# Patient Record
Sex: MALE | Race: WHITE | NOT HISPANIC OR LATINO | Employment: OTHER | URBAN - METROPOLITAN AREA
[De-identification: names, ages, dates, MRNs, and addresses within clinical notes are randomized per-mention and may not be internally consistent; named-entity substitution may affect disease eponyms.]

---

## 2020-11-16 ENCOUNTER — LAB (OUTPATIENT)
Dept: PULMONOLOGY | Facility: CLINIC | Age: 70
End: 2020-11-16

## 2020-11-16 DIAGNOSIS — Z01.812 BLOOD TESTS PRIOR TO TREATMENT OR PROCEDURE: ICD-10-CM

## 2020-11-16 DIAGNOSIS — Z01.812 BLOOD TESTS PRIOR TO TREATMENT OR PROCEDURE: Primary | ICD-10-CM

## 2020-11-16 PROCEDURE — 99000 SPECIMEN HANDLING OFFICE-LAB: CPT | Performed by: INTERNAL MEDICINE

## 2020-11-16 PROCEDURE — U0004 COV-19 TEST NON-CDC HGH THRU: HCPCS | Performed by: INTERNAL MEDICINE

## 2020-11-17 LAB — SARS-COV-2 RNA RESP QL NAA+PROBE: NOT DETECTED

## 2020-11-18 ENCOUNTER — OFFICE VISIT (OUTPATIENT)
Dept: PULMONOLOGY | Facility: CLINIC | Age: 70
End: 2020-11-18

## 2020-11-18 VITALS
TEMPERATURE: 97.8 F | HEART RATE: 65 BPM | RESPIRATION RATE: 16 BRPM | WEIGHT: 260 LBS | OXYGEN SATURATION: 93 % | HEIGHT: 70 IN | DIASTOLIC BLOOD PRESSURE: 80 MMHG | SYSTOLIC BLOOD PRESSURE: 148 MMHG | BODY MASS INDEX: 37.22 KG/M2

## 2020-11-18 DIAGNOSIS — R06.00 DYSPNEA, UNSPECIFIED TYPE: Primary | ICD-10-CM

## 2020-11-18 DIAGNOSIS — I50.22 CHRONIC SYSTOLIC CHF (CONGESTIVE HEART FAILURE) (HCC): ICD-10-CM

## 2020-11-18 DIAGNOSIS — J41.1 MUCOPURULENT CHRONIC BRONCHITIS (HCC): ICD-10-CM

## 2020-11-18 PROCEDURE — 94726 PLETHYSMOGRAPHY LUNG VOLUMES: CPT | Performed by: INTERNAL MEDICINE

## 2020-11-18 PROCEDURE — 94060 EVALUATION OF WHEEZING: CPT | Performed by: INTERNAL MEDICINE

## 2020-11-18 PROCEDURE — 99204 OFFICE O/P NEW MOD 45 MIN: CPT | Performed by: INTERNAL MEDICINE

## 2020-11-18 PROCEDURE — 94729 DIFFUSING CAPACITY: CPT | Performed by: INTERNAL MEDICINE

## 2020-11-18 RX ORDER — LANOLIN ALCOHOL/MO/W.PET/CERES
1000 CREAM (GRAM) TOPICAL DAILY
COMMUNITY

## 2020-11-18 RX ORDER — TRAZODONE HYDROCHLORIDE 100 MG/1
100 TABLET ORAL NIGHTLY
COMMUNITY

## 2020-11-18 RX ORDER — FINASTERIDE 5 MG/1
5 TABLET, FILM COATED ORAL DAILY
COMMUNITY

## 2020-11-18 RX ORDER — ALLOPURINOL 300 MG/1
300 TABLET ORAL DAILY
COMMUNITY

## 2020-11-18 RX ORDER — OMEPRAZOLE 20 MG/1
20 CAPSULE, DELAYED RELEASE ORAL DAILY
COMMUNITY

## 2020-11-18 RX ORDER — ASPIRIN 81 MG/1
81 TABLET ORAL DAILY
COMMUNITY

## 2020-11-18 RX ORDER — ROPINIROLE 4 MG/1
4 TABLET, FILM COATED ORAL NIGHTLY
COMMUNITY

## 2020-11-18 RX ORDER — TAMSULOSIN HYDROCHLORIDE 0.4 MG/1
1 CAPSULE ORAL DAILY
COMMUNITY

## 2020-11-18 RX ORDER — TIZANIDINE 4 MG/1
8 TABLET ORAL 3 TIMES DAILY
COMMUNITY
Start: 2020-09-30

## 2020-11-18 RX ORDER — NITROGLYCERIN 0.4 MG/1
TABLET SUBLINGUAL
COMMUNITY
Start: 2014-05-21

## 2020-11-18 RX ORDER — FLUTICASONE PROPIONATE 50 MCG
2 SPRAY, SUSPENSION (ML) NASAL DAILY
COMMUNITY

## 2020-11-18 RX ORDER — ALBUTEROL SULFATE 90 UG/1
4 AEROSOL, METERED RESPIRATORY (INHALATION) ONCE
Status: COMPLETED | OUTPATIENT
Start: 2020-11-18 | End: 2020-11-18

## 2020-11-18 RX ORDER — TIOTROPIUM BROMIDE AND OLODATEROL 3.124; 2.736 UG/1; UG/1
2 SPRAY, METERED RESPIRATORY (INHALATION)
COMMUNITY

## 2020-11-18 RX ORDER — LISINOPRIL 40 MG/1
40 TABLET ORAL DAILY
COMMUNITY

## 2020-11-18 RX ORDER — ALBUTEROL SULFATE 90 UG/1
2 AEROSOL, METERED RESPIRATORY (INHALATION) EVERY 4 HOURS PRN
COMMUNITY

## 2020-11-18 RX ORDER — TOPIRAMATE 100 MG/1
100 TABLET, FILM COATED ORAL 2 TIMES DAILY
COMMUNITY

## 2020-11-18 RX ORDER — ATORVASTATIN CALCIUM 80 MG/1
80 TABLET, FILM COATED ORAL DAILY
COMMUNITY

## 2020-11-18 RX ORDER — ASCORBIC ACID 500 MG
500 TABLET ORAL DAILY
COMMUNITY

## 2020-11-18 RX ORDER — CARVEDILOL 12.5 MG/1
12.5 TABLET ORAL 2 TIMES DAILY WITH MEALS
COMMUNITY

## 2020-11-18 RX ADMIN — ALBUTEROL SULFATE 4 PUFF: 90 AEROSOL, METERED RESPIRATORY (INHALATION) at 09:48

## 2020-11-18 NOTE — PROGRESS NOTES
New Patient Pulmonary Office Visit      Patient Name: Migue Alexander    Referring Physician: Hector Phelps MD    Chief Complaint:    Chief Complaint   Patient presents with   • Shortness of Breath       History of Present Illness: Migue Alexander is a 70 y.o. male who is here today to establish care with Pulmonary.  Patient has a past medical history significant for COPD, shoulder sleep apnea, coronary artery disease as post CABG, CKD, PTSD, chronic alcohol abuse, and former tobacco user quit in 2017 with a 50-pack-year history.  Who presents to pulmonary for evaluation of his COPD.  Patient initially started him shortness of breath back in 2008 which gradually worsened over time, he has been on multiple inhalers in the past, but most recently has been moved over to Asmanex as well as Stiolto.  There is a daily nonproductive cough, that is currently stable.  Does not always use his CPAP for short sleep apnea, but will attempt to do so.  Currently has GERD as well and uses Prevacid.  And evaluation for his shortness of breath initially he did undergo a cardiopulmonary stress test which showed a cardiac limitation to exercise with an adequate heart rate response, pulmonary limitation exercise with the saturation between 98 and 91%, with significant air trapping.  He also went a right heart catheterization which showed a mean pulmonary artery pressure of 29, with a pulmonary capillary wedge pressure of 15, pulmonary vascular resistance was 2.6 Wood units.  He notes he has a long history of shortness of breath, that is worse when he exercises.  He states that he has burning sensations in his leg associated with exercise as well, he has had a work-up from a vascular standpoint and is told he has neuropathy for unknown reasons.  He denies any fever, chills, nausea, or vomiting.  He recently had his lisinopril changed due to the concern that this could be causing his underlying cough he has not stopped the  medication as of yet should be here later this week.      Review of Systems:   Review of Systems   Constitutional: Negative for activity change, appetite change, chills and diaphoresis.   HENT: Negative for congestion, postnasal drip, sinus pressure and voice change.    Eyes: Negative for blurred vision.   Respiratory: Positive for cough and shortness of breath. Negative for wheezing.    Cardiovascular: Negative for chest pain.   Gastrointestinal: Negative for abdominal pain.   Musculoskeletal: Negative for myalgias.   Skin: Negative for color change and dry skin.   Allergic/Immunologic: Negative for environmental allergies.   Neurological: Negative for weakness and confusion.   Hematological: Negative for adenopathy.   Psychiatric/Behavioral: Negative for sleep disturbance and depressed mood.       Past Medical History:   Past Medical History:   Diagnosis Date   • COPD (chronic obstructive pulmonary disease) (CMS/[facility])    • GERD (gastroesophageal reflux disease)    • Hyperlipemia    • Hypertension        Past Surgical History:   Past Surgical History:   Procedure Laterality Date   • BACK SURGERY     • KNEE ARTHROPLASTY, PARTIAL REPLACEMENT     • SHOULDER SURGERY         Family History:   Family History   Problem Relation Age of Onset   • Alzheimer's disease Mother    • Heart failure Maternal Grandfather        Social History:   Social History     Socioeconomic History   • Marital status:      Spouse name: Not on file   • Number of children: Not on file   • Years of education: Not on file   • Highest education level: Not on file   Tobacco Use   • Smoking status: Former Smoker     Packs/day: 1.50     Years: 43.00     Pack years: 64.50     Types: Cigarettes     Quit date: 2017     Years since quitting: 3.8   • Smokeless tobacco: Never Used   Substance and Sexual Activity   • Alcohol use: Yes     Drinks per session: 3 or 4     Binge frequency: Weekly   • Drug use: Never   • Sexual activity: Defer        Medications:     Current Outpatient Medications:   •  albuterol sulfate  (90 Base) MCG/ACT inhaler, Inhale 2 puffs Every 4 (Four) Hours As Needed for Wheezing., Disp: , Rfl:   •  allopurinol (ZYLOPRIM) 300 MG tablet, Take 300 mg by mouth Daily., Disp: , Rfl:   •  aspirin 81 MG EC tablet, Take 81 mg by mouth Daily., Disp: , Rfl:   •  atorvastatin (LIPITOR) 80 MG tablet, Take 80 mg by mouth Daily., Disp: , Rfl:   •  carvedilol (COREG) 12.5 MG tablet, Take 12.5 mg by mouth 2 (Two) Times a Day With Meals., Disp: , Rfl:   •  finasteride (PROSCAR) 5 MG tablet, Take 5 mg by mouth Daily., Disp: , Rfl:   •  fluticasone (FLONASE) 50 MCG/ACT nasal spray, 2 sprays into the nostril(s) as directed by provider Daily., Disp: , Rfl:   •  lisinopril (PRINIVIL,ZESTRIL) 40 MG tablet, Take 40 mg by mouth Daily., Disp: , Rfl:   •  mometasone (ASMANEX TWISTHALER) inhaler 220 mcg/inhalation, Inhale 2 puffs Daily., Disp: , Rfl:   •  Morphine Sulfate ER 30 MG tablet extended-release, Take  by mouth Daily., Disp: , Rfl:   •  nitroglycerin (Nitrostat) 0.4 MG SL tablet, Place  under the tongue., Disp: , Rfl:   •  omeprazole (priLOSEC) 20 MG capsule, Take 20 mg by mouth Daily., Disp: , Rfl:   •  rOPINIRole (REQUIP) 4 MG tablet, Take 4 mg by mouth Every Night., Disp: , Rfl:   •  tamsulosin (FLOMAX) 0.4 MG capsule 24 hr capsule, Take 1 capsule by mouth Daily., Disp: , Rfl:   •  tiotropium bromide-olodaterol (Stiolto Respimat) 2.5-2.5 MCG/ACT aerosol solution inhaler, Inhale 2 puffs Daily., Disp: , Rfl:   •  tiZANidine (ZANAFLEX) 4 MG tablet, Take 8 mg by mouth 3 (Three) Times a Day., Disp: , Rfl:   •  topiramate (TOPAMAX) 100 MG tablet, Take 100 mg by mouth 2 (Two) Times a Day., Disp: , Rfl:   •  traZODone (DESYREL) 100 MG tablet, Take 100 mg by mouth Every Night., Disp: , Rfl:   •  vitamin B-12 (CYANOCOBALAMIN) 1000 MCG tablet, Take 1,000 mcg by mouth Daily., Disp: , Rfl:   •  vitamin C (ASCORBIC ACID) 500 MG tablet, Take 500 mg by  "mouth Daily., Disp: , Rfl:   No current facility-administered medications for this visit.     Allergies:   Allergies   Allergen Reactions   • Sulfa Antibiotics Hives       Physical Exam:  Vital Signs:   Vitals:    11/18/20 0919   BP: 148/80   BP Location: Right arm   Patient Position: Sitting   Cuff Size: Adult   Pulse: 65   Resp: 16   Temp: 97.8 °F (36.6 °C)   SpO2: 93%  Comment: room air at rest   Weight: 118 kg (260 lb)   Height: 177.8 cm (70\")       Physical Exam  Vitals signs and nursing note reviewed.   Constitutional:       General: He is not in acute distress.     Appearance: He is well-developed and normal weight. He is not ill-appearing or toxic-appearing.   HENT:      Head: Normocephalic and atraumatic.      Right Ear: External ear normal.      Left Ear: External ear normal.      Nose: Nose normal.      Mouth/Throat:      Mouth: Mucous membranes are moist.      Pharynx: Oropharynx is clear. No oropharyngeal exudate or posterior oropharyngeal erythema.   Eyes:      Conjunctiva/sclera: Conjunctivae normal.      Pupils: Pupils are equal, round, and reactive to light.   Neck:      Musculoskeletal: Normal range of motion and neck supple. No neck rigidity.   Cardiovascular:      Rate and Rhythm: Normal rate and regular rhythm.      Pulses: Normal pulses.      Heart sounds: Normal heart sounds. No murmur. No friction rub. No gallop.    Pulmonary:      Effort: Pulmonary effort is normal. No respiratory distress.      Breath sounds: Normal breath sounds. No wheezing, rhonchi or rales.   Abdominal:      General: Bowel sounds are normal. There is no distension.      Palpations: Abdomen is soft.      Tenderness: There is no abdominal tenderness. There is no rebound.   Musculoskeletal: Normal range of motion.      Right lower leg: No edema.      Left lower leg: No edema.   Skin:     General: Skin is warm and dry.      Capillary Refill: Capillary refill takes less than 2 seconds.   Neurological:      General: No focal " deficit present.      Mental Status: He is alert and oriented to person, place, and time.   Psychiatric:         Mood and Affect: Mood normal.         Behavior: Behavior normal.         Thought Content: Thought content normal.         Judgment: Judgment normal.         Results Review:   - I personally reviewed the pts imaging from chest x-ray 11/18/2020 showed no acute cardiopulmonary findings, but was consistent with old granulomatous disease.  - I personally reviewed the pts PFT from 11/18/2020 showed moderate obstruction with significant air trapping, moderately Doose DLCO and no restriction.  - I personally reviewed the pts chart which included an echo showing EF of 35 to 45%, cardiopulmonary stress test with cardiac and pulmonary limitations, and a right heart cath which shows signs of heart failure, and elevated pulmonary capillary wedge pressure as well as mean pulmonary artery pressure at 15 and 29 expectedly, but fully vascular system was only 2.6, as this is not consistent with pulmonary hypertension.    Assessment / Plan:   1. Dyspnea, unspecified type (Primary)  2. Mucopurulent chronic bronchitis (CMS/HCC)  3. Chronic systolic CHF (congestive heart failure) (CMS/HCC)  -Patients shortness of breath is likely multifactorial due to underlying COPD as well as CHF, his cardiopulmonary stress test is quite helpful in determining that he has a cardiac limitation and a pulmonary limitation, with significant air trapping.  Which we then saw on his PFTs from today.  He is on appropriate inhaler therapy, with Stiolto and Asmanex, which I would like him to continue.  In addition to that I do think he would benefit from pulmonary rehab, will have to be careful only work with him given the fact that he has had previous shoulder surgery with some limitations, addition of this having his lower extremity pain and burning could limit him on the amount of cardiovascular exercise that he could perform.  He is very adamant  though that he would be able to complete the rehab and would still like to try.  I have gone ahead and ordered rehab to be started on him I did explain to him that due to COVID-19 we have been backed up a little bit we will get him as soon as possible.  -He should continue on his current cardiac medications, appears to be euvolemic on exam today, blood pressure slightly elevated at 148/80, but is switching his blood pressure medications currently.  -Patient is okay to follow back up with his primary pulmonologist at the [facility] in [facility].    Follow Up:   Return if symptoms worsen or fail to improve.     JÚNIOR Rojas, DO  Pulmonary and Critical Care Medicine  Note Electronically Signed    Please note that portions of this note may have been completed with a voice recognition program. Efforts were made to edit the dictations, but occasionally words are mistranscribed.

## 2020-11-19 DIAGNOSIS — R06.00 DYSPNEA, UNSPECIFIED TYPE: Primary | ICD-10-CM

## 2021-03-01 ENCOUNTER — IMMUNIZATION (OUTPATIENT)
Dept: VACCINE CLINIC | Facility: HOSPITAL | Age: 71
End: 2021-03-01

## 2021-03-01 PROCEDURE — 0001A: CPT | Performed by: INTERNAL MEDICINE

## 2021-03-01 PROCEDURE — 91300 HC SARSCOV02 VAC 30MCG/0.3ML IM: CPT | Performed by: INTERNAL MEDICINE

## 2021-03-22 ENCOUNTER — IMMUNIZATION (OUTPATIENT)
Dept: VACCINE CLINIC | Facility: HOSPITAL | Age: 71
End: 2021-03-22

## 2021-03-22 PROCEDURE — 0002A: CPT | Performed by: INTERNAL MEDICINE

## 2021-03-22 PROCEDURE — 91300 HC SARSCOV02 VAC 30MCG/0.3ML IM: CPT | Performed by: INTERNAL MEDICINE

## 2021-04-13 ENCOUNTER — CLINICAL SUPPORT (OUTPATIENT)
Dept: PULMONOLOGY | Facility: CLINIC | Age: 71
End: 2021-04-13

## 2021-04-13 VITALS
HEART RATE: 63 BPM | OXYGEN SATURATION: 98 % | TEMPERATURE: 96.8 F | RESPIRATION RATE: 18 BRPM | DIASTOLIC BLOOD PRESSURE: 85 MMHG | SYSTOLIC BLOOD PRESSURE: 140 MMHG

## 2021-04-13 DIAGNOSIS — J44.9 COPD WITH CHRONIC BRONCHITIS (HCC): ICD-10-CM

## 2021-04-13 PROCEDURE — G0424 PULMONARY REHAB W EXER: HCPCS | Performed by: INTERNAL MEDICINE

## 2021-04-13 NOTE — PROGRESS NOTES
[facility]  Pulmonary Rehabilitation  Daily Treatment Log    Name: Migue Alexander : 1950 Date: 2021     Session: 1/ approved: 18          Time In/Out: 9:     COPD Charges:  x1    Physician : JÚNIOR Rojas DO      Dx: J44.9      GOLD: 1 []  2 []  3 []  4 []     [place]  [place]     /85   Pulse 63   Temp 96.8 °F (36 °C)   Resp 18   SpO2 98%      Auscultation:  []Clear   [x]Clear and Decreased   []Insp. Wheeze   []Exp. Wheeze     []Rhonchi   []Rales    Target Heart Rate Zone:   Max HR: 150    Exercise Sp02% Blood Pressure Heart Rate JERMAIN Scale/Fatigue   Treadmill: Speed:    Min:   % Grade:        Distance:            Bands:#:    Reps:      Min:   Color Band:             6MWT:Min: 6 distance: 600 MET: 1.9  95  140/85  95  3-4, part of initial eval   Step-ups:#:    Reps:     Min:            Cybex/Vision/Schwinn Bike:  Level:   Speed:  RPM:   Min:   Distance:           Seat:             Hand Weights: Pounds: 5 min: 30 seconds  Curls#24     sets: 1  Presses#    Sets:   Fly#            Sets:   Shoulder Shrugs#     Sets:   Wings#       Sets:   Wrist Curls#   Sets:                   97  140/85  68  1, part of initial eval arm curl test   NuStep: Level:   METS:  Min:     SPM:     Total Steps:   Seat:      Arms:            Arm Ergometer: Level:     Min:   RPM:       Fwd:     Back:   Total Turns:             PLB / DB Spinner: Min:   P-Flex: Level:        Min: 5  IS: Volume:             Min: 5  Warm-up Stretches: Min:                Education and Training: [x]JERMAIN Scale  [x]PLB  [x]DB  [x]IMT  [x]IS  []Vital Signs   []Target HR Zone  []Exercise Vital Signs [] Safe Vital Signs  []Pulmonary Anatomy  []Lung Function  []Lung Disease Process  []Oxygen Use  []Oxygen Safety  []Pulmonary Meds  []Med Instruction  []Cough Technique  []Pulmonary Hygeine  []Infection Prevention  []Signs of Infection  []When to Call MD  []Nutrition  []Weight  Management  []Advance Directives  []Posture  []Body Mechanics []Energy Conservation  []Pacing ADL's  []Stress/Anxiety Management  []Stretching  []Home Exercise  []Smoking Cessation   []NJ Progress  []Maintenance Exercise Program    []Pulmonary Knowledge Test/Education  []Review PFT  []Review Specific Disease  []Review Home Follow-through Compliance  []Safe Exercise []Exercise Progression Strategies    Progress Report: Patient here for initial evaluation for pulmonary rehabilitation.  Today patient performed 6-minute walk test which revealed patient will not require any supplemental oxygen during exercise.  Patient had moderate leg and hip pain which required him to rest for 1 minute during the walk.  Patient was unable to do step up test due to hip and leg pain but was able to complete arm curl test, sit to stand, get up and go and balance test.  Patient meets all requirements for pulmonary rehab and I believe this patient would benefit tremendously.  Patient is very motivated and family is very supportive.  I-S  And P Flex given to patient for home use as well as packet of educational material which will be covered in class.    Treating Clinicians: [x]Kun Yuen RRT     MD in Office: [x]JÚNIOR Mckeon  []RAFITA Jim  []CODEY German  [x]JR Felix    []ARLIN Zendejas []ABE Roche  []RAFITA Gonsales  [x]RAFITA Grajeda  []CRISTINA Felix  [x]CATRINA Ulrich   []ARLIN Cuenca  [x]JÚNIOR Rojas

## 2021-04-27 ENCOUNTER — CLINICAL SUPPORT (OUTPATIENT)
Dept: PULMONOLOGY | Facility: CLINIC | Age: 71
End: 2021-04-27

## 2021-04-27 VITALS
OXYGEN SATURATION: 99 % | SYSTOLIC BLOOD PRESSURE: 138 MMHG | HEART RATE: 55 BPM | TEMPERATURE: 97.3 F | RESPIRATION RATE: 16 BRPM | DIASTOLIC BLOOD PRESSURE: 72 MMHG

## 2021-04-27 DIAGNOSIS — J44.9 CHRONIC OBSTRUCTIVE PULMONARY DISEASE, UNSPECIFIED COPD TYPE (HCC): ICD-10-CM

## 2021-04-27 PROCEDURE — G0424 PULMONARY REHAB W EXER: HCPCS | Performed by: INTERNAL MEDICINE

## 2021-04-27 NOTE — PROGRESS NOTES
[facility]  Pulmonary Rehabilitation  Daily Treatment Log    Name: Migue Alexander : 1950 Date: 2021     Session: 2/ approved: 18          Time In/Out: 1:     COPD Charges:  x1    Physician : JÚNIOR Rojas DO      Dx: J44.9      GOLD: 1 []  2 []  3 []  4 []     [place] Insurance[place]  Secondary Insurance[place]     /72   Pulse 55   Temp 97.3 °F (36.3 °C)   Resp 16   SpO2 99%      Auscultation:  []Clear   [x]Clear and Decreased   []Insp. Wheeze   []Exp. Wheeze     []Rhonchi   []Rales    Target Heart Rate Zone:   Max HR: 150    Exercise Sp02% Blood Pressure Heart Rate JERMAIN Scale/Fatigue   Treadmill: Speed:    Min:   % Grade:        Distance:            Bands:#:    Reps:      Min:   Color Band:             6MWT:Min:   Distance:  MET:             Step-ups:#: 10   reps: 2   min: 10  99  138/78  71  1-2   Cybex/Vision/Schwinn Bike:  Level: 1 Speed: 5.3 RPM: 58 min: 10  Distance: 1.5         seat: 8  98  140/89  77  1-2   Hand Weights: Pounds: 5 min: 10  Curls# 10     Sets: 1  Presses#  10  Sets: 1  Fly# 10           Sets: 1  Shoulder Shrugs#  10   Sets: 1  Wings#  10     Sets: 1  Wrist Curls# 10  Sets: 1                  98  138/78  77  1-2   NuStep: Level:   METS:  Min:     SPM:     Total Steps:   Seat:      Arms:            Arm Ergometer: Level:     Min:   RPM:       Fwd:     Back:   Total Turns:             PLB / DB Spinner: Min: 5  P-Flex: Level:       Min:   IS: Volume:             Min:   Warm-up Stretches: Min: 10  98    68  0      Education and Training: [x]JERMAIN Scale  [x]PLB  [x]DB  []IMT  []IS  [x]Vital Signs   [x]Target HR Zone  [x]Exercise Vital Signs [x] Safe Vital Signs  []Pulmonary Anatomy  []Lung Function  [x]Lung Disease Process  []Oxygen Use  []Oxygen Safety  []Pulmonary Meds  []Med Instruction  []Cough Technique  []Pulmonary Hygeine  []Infection Prevention  []Signs of Infection  []When to Call MD  []Nutrition  []Weight Management  []Advance  Directives  []Posture  []Body Mechanics []Energy Conservation  []Pacing ADL's  []Stress/Anxiety Management  [x]Stretching  []Home Exercise  []Smoking Cessation   []MO Progress  []Maintenance Exercise Program    [x]Pulmonary Knowledge Test/Education  []Review PFT  []Review Specific Disease  []Review Home Follow-through Compliance  []Safe Exercise []Exercise Progression Strategies    Progress Report: Patient here for continuation of pulmonary rehabilitation.  Patient missed all last week due to family emergency out of town.  Education today consisted of COPD, what it is, how it is diagnosed, how it is treated, and ways of coping with shortness of air.  Group exercises consisted of stretches, light hand weights and body weight leg strengthening exercises.  Patient broke off into individual workout session on the stationary bike.  Patient had to stop several times due to lower leg pain/soreness but was able to work through 10 minutes.  Advised patient that we would start slow and gradually build up resistance and tolerance of exercise.  Patient also took the pulmonary knowledge test today and will review next session.    Treating Clinicians: [x]Kun Yuen RRT     MD in Office: []JÚNIOR Mckeon  []RAFITA Jim  []CODEY German  []JR Felix    []ARLIN Zendejas []ABE Roche  []RAFITA Gonsales  [x]RAFITA Grajeda  []CRISTINA Felix  []CATRINA Ulrich   [x]ARLIN Cuenca  []JÚNIOR Rojas

## 2021-05-04 ENCOUNTER — CLINICAL SUPPORT (OUTPATIENT)
Dept: PULMONOLOGY | Facility: CLINIC | Age: 71
End: 2021-05-04

## 2021-05-04 VITALS
DIASTOLIC BLOOD PRESSURE: 78 MMHG | HEART RATE: 59 BPM | TEMPERATURE: 97.6 F | RESPIRATION RATE: 18 BRPM | SYSTOLIC BLOOD PRESSURE: 138 MMHG | OXYGEN SATURATION: 98 %

## 2021-05-04 DIAGNOSIS — J44.9 CHRONIC OBSTRUCTIVE PULMONARY DISEASE, UNSPECIFIED COPD TYPE (HCC): ICD-10-CM

## 2021-05-04 PROCEDURE — G0424 PULMONARY REHAB W EXER: HCPCS | Performed by: INTERNAL MEDICINE

## 2021-05-04 NOTE — PROGRESS NOTES
[facility]  Pulmonary Rehabilitation  Daily Treatment Log    Name: Migue Alexander : 1950 Date: 2021     Session: 3 approved: 18          Time In/Out: 1:     COPD Charges:  x1    Physician : JÚNIOR Rojas DO      Dx: J44.9      GOLD: 1 []  2 []  3 []  4 []     Primary Insurance: [place] Secondary Insurance:[place]    /78   Pulse 59   Temp 97.6 °F (36.4 °C)   Resp 18   SpO2 98%      Auscultation:  []Clear   [x]Clear and Decreased   []Insp. Wheeze   []Exp. Wheeze     []Rhonchi   []Rales    Target Heart Rate Zone:   Max HR: 150    Exercise Sp02% Blood Pressure Heart Rate JERMIAN Scale/Fatigue   Treadmill: Speed:    Min:   % Grade:        Distance:            Bands:#:    Reps:      Min:   Color Band:             6MWT:Min:   Distance:  MET:             Step-ups:#: 10   reps: 2   min: 10  99  138/78  61  0-1   Cybex/Vision/Schwinn Bike:  Level:   Speed:  RPM:   Min:   Distance:           Seat:             Hand Weights: Pounds: 5 min: 10  Curls# 10     Sets: 1  Presses#  10  Sets: 1  Fly# 10           Sets: 1  Shoulder Shrugs#  10   Sets: 1  Wings#  10     Sets: 1  Wrist Curls# 10  Sets: 1                  99  138/82  61  0-1   NuStep: Level: 4 METS: 2.4 min: 20     SPM:   71 total Steps: 1280  Seat: 13    arms: 11  99  140/85  64  0-1   Arm Ergometer: Level:     Min:   RPM:       Fwd:     Back:   Total Turns:             PLB / DB Spinner: Min: 5  P-Flex: Level:        Min:   IS: Volume:             Min:   Warm-up Stretches: Min: 10  98    59  0      Education and Training: [x]JERMAIN Scale  [x]PLB  [x]DB  []IMT  []IS  [x]Vital Signs   [x]Target HR Zone  [x]Exercise Vital Signs [x] Safe Vital Signs  []Pulmonary Anatomy  []Lung Function  []Lung Disease Process  []Oxygen Use  []Oxygen Safety  [x]Pulmonary Meds  [x]Med Instruction  []Cough Technique  []Pulmonary Hygeine  []Infection Prevention  []Signs of Infection  []When to Call MD  []Nutrition  []Weight Management   []Advance Directives  []Posture  []Body Mechanics []Energy Conservation  []Pacing ADL's  []Stress/Anxiety Management  []Stretching  []Home Exercise  []Smoking Cessation   [][place] Progress  []Maintenance Exercise Program    []Pulmonary Knowledge Test/Education  []Review PFT  []Review Specific Disease  []Review Home Follow-through Compliance  []Safe Exercise []Exercise Progression Strategies    Progress Report:  Patient here for continuation of pulmonary rehabilitation.  Education consisted of pulmonary meds with med instruction.  Reviewed pulmonary knowledge test.  Discussed the use of spacers and demonstrated how to use a spacer with an MDI.  Patient was able to increase length and tolerance of exercise today with minimal cues for proper PLB.        Treating Clinicians: [x]Kun Yuen RRT     MD in Office: [x]JÚNIOR Mckeon  []RAFITA Jim  []CODEY German  []JR Felix    []ARLIN Zendejas []ABE Roche  []RAFITA Gonasles  []RAFITA Grjaeda  []CRISTINA Felix  []CATRINA Ulrich   []ARLIN Cuenca  [x]JÚNIOR Rojas

## 2021-05-06 ENCOUNTER — CLINICAL SUPPORT (OUTPATIENT)
Dept: PULMONOLOGY | Facility: CLINIC | Age: 71
End: 2021-05-06

## 2021-05-06 VITALS
SYSTOLIC BLOOD PRESSURE: 135 MMHG | HEART RATE: 58 BPM | DIASTOLIC BLOOD PRESSURE: 82 MMHG | OXYGEN SATURATION: 100 % | TEMPERATURE: 97.6 F | RESPIRATION RATE: 18 BRPM

## 2021-05-06 DIAGNOSIS — J44.9 CHRONIC OBSTRUCTIVE PULMONARY DISEASE, UNSPECIFIED COPD TYPE (HCC): ICD-10-CM

## 2021-05-06 PROCEDURE — G0424 PULMONARY REHAB W EXER: HCPCS | Performed by: INTERNAL MEDICINE

## 2021-05-06 NOTE — PROGRESS NOTES
[facility]  Pulmonary Rehabilitation  Daily Treatment Log    Name: Migue Alexander : 1950 Date: 2021     Session: 4/ approved: 18          Time In/Out: 1:     COPD Charges:  x1    Physician : JÚNIOR Rojas DO      Dx: J44.9      GOLD: 1 []  2 []  3 []  4 []     Primary Insurance:  [place]  Secondary Insurance: [place]    /82   Pulse 58   Temp 97.6 °F (36.4 °C)   Resp 18   SpO2 100%      Auscultation:  []Clear   [x]Clear and Decreased   []Insp. Wheeze   []Exp. Wheeze     []Rhonchi   []Rales    Target Heart Rate Zone:   Max HR: 150    Exercise Sp02% Blood Pressure Heart Rate JERMAIN Scale/Fatigue   Treadmill: Speed:    Min:   % Grade:        Distance:            Bands:#:    Reps:      Min:   Color Band:             6MWT:Min:   Distance:  MET:             Step-ups:#: 10   reps: 2   min: 10  98  138/78  78  0-1   Cybex/Vision/Schwinn Bike:  Level:   Speed:  RPM:   Min:   Distance:           Seat:             Hand Weights: Pounds: 5 min: 10  Curls# 10     Sets: 1  Presses#  10  Sets: 1  Fly# 10           Sets: 1  Shoulder Shrugs#  10   Sets: 1  Wings#  10     Sets: 1  Wrist Curls# 10  Sets: 1                  98  138/78  61  0-1   NuStep: Level: 3 METS: 2.4 min: 25     SPM:   64 total Steps: 1388  Seat: 13    arms: 11  97  140/82  71  0-1   Arm Ergometer: Level:     Min:   RPM:       Fwd:     Back:   Total Turns:             PLB / DB Spinner: Min: 5  P-Flex: Level:        Min:   IS: Volume:             Min:   Warm-up Stretches: Min: 10  98    58  0      Education and Training: [x]JERMAIN Scale  [x]PLB  [x]DB  []IMT  []IS  [x]Vital Signs   [x]Target HR Zone  [x]Exercise Vital Signs [x] Safe Vital Signs  []Pulmonary Anatomy  []Lung Function  []Lung Disease Process  []Oxygen Use  []Oxygen Safety  []Pulmonary Meds  []Med Instruction  [x]Cough Technique  [x]Pulmonary Hygeine  [x]Infection Prevention  [x]Signs of Infection  [x]When to Call MD  []Nutrition  []Weight Management   []Advance Directives  []Posture  []Body Mechanics []Energy Conservation  []Pacing ADL's  []Stress/Anxiety Management  []Stretching  []Home Exercise  []Smoking Cessation   [][place] Progress  []Maintenance Exercise Program    []Pulmonary Knowledge Test/Education  []Review PFT  []Review Specific Disease  []Review Home Follow-through Compliance  []Safe Exercise []Exercise Progression Strategies    Progress Report:  Patient here for continuation of pulmonary rehabilitation.  Education consisted of pulmonary hygiene/cough technique/infection prevention/signs of infection and when to call the doctor.  Patient was able to increase intensity and length of exercise today with minimal to moderate cues for PLB.  Patient continues to do well with back/leg pain.     Treating Clinicians: [x]Kun Yuen RRT MD in Office: [x]JÚNIOR Mckeon  []RAFITA Jim  []CODEY German  []JR Felix    []ARLIN Zendejas []ABE Roche  []RAFITA Gonsales  []RAFITA Grajeda  []CRISTINA Felix  [x]CATRINA Ulrich   []ARLIN Cuenca  [x]JÚNIOR Rojas

## 2021-05-07 ENCOUNTER — OFFICE VISIT (OUTPATIENT)
Dept: PULMONOLOGY | Facility: CLINIC | Age: 71
End: 2021-05-07

## 2021-05-07 VITALS
HEIGHT: 70 IN | OXYGEN SATURATION: 94 % | TEMPERATURE: 96.8 F | HEART RATE: 74 BPM | SYSTOLIC BLOOD PRESSURE: 150 MMHG | WEIGHT: 267 LBS | DIASTOLIC BLOOD PRESSURE: 90 MMHG | BODY MASS INDEX: 38.22 KG/M2

## 2021-05-07 DIAGNOSIS — J30.9 ALLERGIC RHINITIS, UNSPECIFIED SEASONALITY, UNSPECIFIED TRIGGER: ICD-10-CM

## 2021-05-07 DIAGNOSIS — J41.1 MUCOPURULENT CHRONIC BRONCHITIS (HCC): Primary | ICD-10-CM

## 2021-05-07 DIAGNOSIS — I50.22 CHRONIC SYSTOLIC CHF (CONGESTIVE HEART FAILURE) (HCC): ICD-10-CM

## 2021-05-07 DIAGNOSIS — K21.00 GASTROESOPHAGEAL REFLUX DISEASE WITH ESOPHAGITIS WITHOUT HEMORRHAGE: ICD-10-CM

## 2021-05-07 PROCEDURE — 99214 OFFICE O/P EST MOD 30 MIN: CPT | Performed by: INTERNAL MEDICINE

## 2021-05-07 NOTE — PROGRESS NOTES
Follow Up Office Note       Patient Name: Migue Alexander    Referring Physician: No ref. provider found    Chief Complaint:    Chief Complaint   Patient presents with   • COPD   • Shortness of Breath       History of Present Illness: Migue Alexander is a 71 y.o. male who is here today to follow-up care with Pulmonary.   Patient has a past medical history significant for COPD, obstructive sleep apnea, coronary artery disease as post CABG, CKD, PTSD, chronic alcohol abuse, and former tobacco user quit in 2017 with a 50-pack-year history.  He presents back today after having a COPD exacerbation roughly 2 to 3 weeks ago he was in urgent treatment center and had to get a round of prednisone and antibiotics.  He felt like that really improved his overall symptoms although was dealing bad until he started working with pulmonary rehab.  He states that since he started pulmonary rehab he started breathing much easier and his congestion has slowly improved.  He has a chronic cough although it is stable.  He continues on the Stiolto and Asmanex.  He is albuterol to use on a as needed basis.  His weight has been stable denies any significant lower extremity edema.  He has no other acute issues at this time.    Review of Systems:   Review of Systems   Constitutional: Negative for chills, fatigue and fever.   HENT: Negative for congestion and voice change.    Eyes: Negative for blurred vision.   Respiratory: Positive for cough, shortness of breath and wheezing.    Cardiovascular: Negative for chest pain.   Skin: Negative for dry skin.   Hematological: Negative for adenopathy.   Psychiatric/Behavioral: Negative for agitation and depressed mood.       The following portions of the patient's history were reviewed and updated as appropriate: allergies, current medications, past family history, past medical history, past social history, past surgical history and problem list.    Physical Exam:  Vital Signs:   Vitals:     "05/07/21 1153   BP: 150/90   Pulse: 74   Temp: 96.8 °F (36 °C)   SpO2: 94%  Comment: resting art room air   Weight: 121 kg (267 lb)   Height: 177.8 cm (70\")       Physical Exam  Vitals and nursing note reviewed.   Constitutional:       General: He is not in acute distress.     Appearance: He is well-developed. He is obese. He is not ill-appearing or toxic-appearing.   Cardiovascular:      Rate and Rhythm: Normal rate and regular rhythm.      Pulses: Normal pulses.      Heart sounds: Normal heart sounds. No murmur heard.   No gallop.    Pulmonary:      Effort: Pulmonary effort is normal.      Breath sounds: Normal breath sounds. No wheezing, rhonchi or rales.   Musculoskeletal:      Right lower leg: No edema.      Left lower leg: No edema.   Neurological:      Mental Status: He is alert.         Immunization History   Administered Date(s) Administered   • COVID-19 (PFIZER) 03/01/2021, 03/22/2021       Results Review:   - imaging from chest x-ray 11/18/2020 showed no acute cardiopulmonary findings, but was consistent with old granulomatous disease.  - PFT from 11/18/2020 showed moderate obstruction with significant air trapping, moderately reduced DLCO and no restriction.  - echo showing EF of 35 to 45%, cardiopulmonary stress test with cardiac and pulmonary limitations, and a right heart cath which shows signs of heart failure, and elevated pulmonary capillary wedge pressure as well as mean pulmonary artery pressure at 15 and 29 expectedly, but fully vascular system was only 2.6, as this is not consistent with pulmonary hypertension.    Assessment / Plan:   1. Mucopurulent chronic bronchitis (CMS/HCC) (Primary)  -Patient with gold stage III class C COPD.  He has had one exacerbation since the last visit.  I told him that we do have more medications that we can provide him for frequent exacerbations such as either azithromycin or Daliresp but at this time I do not think they are indicated.  I would like him to " complete pulmonary rehab first and continue with the Asmanex and Stiolto.  If he continues to have exacerbations after completing pulmonary rehab we can consider starting 1 of those 2 medications.  He verbalized understanding of this.    2. Chronic systolic CHF (congestive heart failure) (CMS/[facility])  -Continue his current medication regimen with beta-blocker, ACE, statin, and aspirin.  He appears to be euvolemic on exam today.  Blood pressure slightly elevated at 150/90.  He should continue to follow [facility].    3. Allergic rhinitis, unspecified seasonality, unspecified trigger  -Allergies could be playing a role in his current shortness of breath although difficult to tell.  I still want him to get to pulmonary rehab first and then will adjust allergy regimen more.  For now continue Flonase 2 place per nostril nightly.    4. Gastroesophageal reflux disease with esophagitis without hemorrhage  -With regards to his reflux we will continue to discuss dietary and lifestyle modifications.  I did not get a chance to give him the education sheet on today's visit but I will do this at the next visit in addition to that apply continue on his Prilosec 20 mg daily on empty stomach 30 minutes before eating.    Level of service justified based on 30 minutes spent in patient care on this date of service including, but not limited to: preparing to see the patient, obtaining and/or reviewing history, performing medically appropriate examination, ordering tests/medicine/procedures, independently interpreting results, documenting clinical information in EHR, and counseling/education of patient/family/caregiver. (Level 4 30-39 minutes; Level 5 40-54 minutes)      Follow Up:   Return in about 3 months (around 8/7/2021).       JÚNIOR Rojas, DO  Pulmonary and Critical Care Medicine  Note Electronically Signed    Please note that portions of this note may have been completed with a voice recognition  program. Efforts were made to edit the dictations, but occasionally words are mistranscribed.

## 2021-05-11 ENCOUNTER — CLINICAL SUPPORT (OUTPATIENT)
Dept: PULMONOLOGY | Facility: CLINIC | Age: 71
End: 2021-05-11

## 2021-05-11 VITALS
SYSTOLIC BLOOD PRESSURE: 138 MMHG | TEMPERATURE: 97.1 F | HEART RATE: 64 BPM | RESPIRATION RATE: 16 BRPM | OXYGEN SATURATION: 98 % | DIASTOLIC BLOOD PRESSURE: 85 MMHG

## 2021-05-11 DIAGNOSIS — J44.9 CHRONIC OBSTRUCTIVE PULMONARY DISEASE, UNSPECIFIED COPD TYPE (HCC): ICD-10-CM

## 2021-05-11 PROCEDURE — G0424 PULMONARY REHAB W EXER: HCPCS | Performed by: INTERNAL MEDICINE

## 2021-05-11 NOTE — PROGRESS NOTES
[facility]  Pulmonary Rehabilitation  Daily Treatment Log    Name: Migue Alexander : 1950 Date: 2021     Session:  approved: 18          Time In/Out: 1:     COPD Charges:  x1    Physician : JÚNIOR Rojas DO      Dx: J44.9      GOLD: 1 []  2 []  3 []  4 []     [place] Insurance[place]     [place] Insurance:[place]     /85   Pulse 64   Temp 97.1 °F (36.2 °C)   Resp 16   SpO2 98%      Auscultation:  []Clear   [x]Clear and Decreased   []Insp. Wheeze   []Exp. Wheeze     []Rhonchi   []Rales    Target Heart Rate Zone:   Max HR: 150    Exercise Sp02% Blood Pressure Heart Rate JERMAIN Scale/Fatigue   Treadmill: Speed:    Min:   % Grade:        Distance:            Bands:#:    Reps:      Min:   Color Band:             6MWT:Min:   Distance:  MET:             Step-ups:#: 2   reps: 10   min: 10  98  138/85  71  1-2   Cybex/Vision/Schwinn Bike:  Level:   Speed:  RPM:   Min:   Distance:           Seat:             Hand Weights: Pounds: 5 min: 10  Curls# 10     Sets: 1  Presses#  10  Sets: 1  Fly# 10           Sets: 1  Shoulder Shrugs#  10   Sets: 1  Wings#  10     Sets: 1  Wrist Curls# 10  Sets: 1                  98  138/85  71  0-1   NuStep: Level: 5 METS: 2.4 min: 30     SPM:   65 total Steps:   Seat: 13    arms: 11  95  140/90  79  2-3   Arm Ergometer: Level:     Min:   RPM:       Fwd:     Back:   Total Turns:             PLB / DB Spinner: Min: 5  P-Flex: Level:        Min:   IS: Volume:             Min:   Warm-up Stretches: Min: 10  98   64  0      Education and Training: [x]JERMAIN Scale  [x]PLB  [x]DB  []IMT  []IS  [x]Vital Signs   [x]Target HR Zone  [x]Exercise Vital Signs [x] Safe Vital Signs  []Pulmonary Anatomy  []Lung Function  []Lung Disease Process  []Oxygen Use  []Oxygen Safety  []Pulmonary Meds  []Med Instruction  []Cough Technique  []Pulmonary Hygeine  []Infection Prevention  []Signs of Infection  []When to Call MD  []Nutrition  []Weight Management   []Advance Directives  [x]Posture  [x]Body Mechanics [x]Energy Conservation  [x]Pacing ADL's  []Stress/Anxiety Management  [x]Stretching  []Home Exercise  []Smoking Cessation   []MT Progress  []Maintenance Exercise Program    []Pulmonary Knowledge Test/Education  []Review PFT  []Review Specific Disease  []Review Home Follow-through Compliance  []Safe Exercise []Exercise Progression Strategies    Progress Report:  Patient here for continuation of pulmonary rehabilitation.  Education consisted of energy conservation, pacing ADLs, posture and body mechanics.  Patient was able to increase tolerance and intensity and exercises today with minimal cues for PLB.  Patient doing very well.    Treating Clinicians: [x]Kun Yuen RRT MD in Office: [x]JÚNIOR Mckeon  []RAFITA Jim  []CODEY German  []JR Felix    []ARLIN Zendejas []ABE Roche  []RAFITA Gonsales  [x]RAFITA Grajeda  [x]CRISTINA Felix  []CATRINA Ulrich   []ARLIN Cuenca  []JÚNIOR Rojas

## 2021-05-13 ENCOUNTER — CLINICAL SUPPORT (OUTPATIENT)
Dept: PULMONOLOGY | Facility: CLINIC | Age: 71
End: 2021-05-13

## 2021-05-13 VITALS
SYSTOLIC BLOOD PRESSURE: 140 MMHG | HEART RATE: 69 BPM | RESPIRATION RATE: 16 BRPM | DIASTOLIC BLOOD PRESSURE: 75 MMHG | OXYGEN SATURATION: 99 % | TEMPERATURE: 97.6 F

## 2021-05-13 DIAGNOSIS — J44.9 CHRONIC OBSTRUCTIVE PULMONARY DISEASE, UNSPECIFIED COPD TYPE (HCC): ICD-10-CM

## 2021-05-13 PROCEDURE — G0424 PULMONARY REHAB W EXER: HCPCS | Performed by: INTERNAL MEDICINE

## 2021-05-13 NOTE — PROGRESS NOTES
[facility]  Pulmonary Rehabilitation  Daily Treatment Log    Name: Migue Alexander : 1950 Date: 2021     Session: 6/ approved: 18          Time In/Out: 1:     COPD Charges:  x1    Physician : JÚNIOR Rojas DO      Dx: J44.9      GOLD: 1 []  2 []  3 []  4 []     [place] Insurance: [place]     Secondary Insurance:  [place]    /75   Pulse 69   Temp 97.6 °F (36.4 °C)   Resp 16   SpO2 99%      Auscultation:  []Clear   [x]Clear and Decreased   []Insp. Wheeze   []Exp. Wheeze     []Rhonchi   []Rales    Target Heart Rate Zone:   Max HR: 150    Exercise Sp02% Blood Pressure Heart Rate JERMAIN Scale/Fatigue   Treadmill: Speed:    Min:   % Grade:        Distance:            Bands:#:    Reps:      Min:   Color Band:             6MWT:Min:   Distance:  MET:             Step-ups:#: 10   reps: 2   min: 10  99  140/75  69  1-2   Cybex/Vision/Schwinn Bike:  Level:   Speed:  RPM:   Min:   Distance:           Seat:             Hand Weights: Pounds: 5 min: 10  Curls# 10     Sets: 1  Presses#  10  Sets: 1  Fly# 10           Sets: 1  Shoulder Shrugs#  10   Sets: 1  Wings#  10     Sets: 1  Wrist Curls# 10  Sets: 1                  99  140/75  71  0-1   NuStep: Level: 5 METS: 2.5 min: 30     SPM:   51 total Steps: 1764  Seat: 13    arms: 11  96  140/85  88  2-3   Arm Ergometer: Level:     Min:   RPM:       Fwd:     Back:   Total Turns:             PLB / DB Spinner: Min: 5  P-Flex: Level:        Min:   IS: Volume:             Min:   Warm-up Stretches: Min: 10  99    69  0      Education and Training: [x]JERMAIN Scale  [x]PLB  [x]DB  []IMT  []IS  [x]Vital Signs   [x]Target HR Zone  [x]Exercise Vital Signs [x] Safe Vital Signs  []Pulmonary Anatomy  []Lung Function  []Lung Disease Process  []Oxygen Use  []Oxygen Safety  []Pulmonary Meds  []Med Instruction  []Cough Technique  []Pulmonary Hygeine  []Infection Prevention  []Signs of Infection  []When to Call MD  [x]Nutrition  [x]Weight Management   []Advance Directives  []Posture  []Body Mechanics []Energy Conservation  []Pacing ADL's  []Stress/Anxiety Management  []Stretching  []Home Exercise  []Smoking Cessation   []FL Progress  []Maintenance Exercise Program    []Pulmonary Knowledge Test/Education  []Review PFT  []Review Specific Disease  []Review Home Follow-through Compliance  []Safe Exercise []Exercise Progression Strategies    Progress Report:  Patient here for continuation of pulmonary rehabilitation.  Education today consisted of nutrition, weight management and how nutrition affects COPD.  Patient was able to tolerate an increase in length and intensity of exercise today with minimal to moderate cues for PLB.  Patient had mild to moderate left leg pain today but was able to work through it. Patient continues to do very well.    Treating Clinicians: [x]Kun Yuen RRT MD in Office: [x]JÚNIOR Mckeon  []RAFITA Jim  []CODEY German  []JR Felix    []ARLIN Zendejas []ABE Roche  []RAFITA Gonsales  [x]RAFITA Grajeda  [x]CRISTINA Felix  []CATRINA Ulrich   []ARLIN Cuenca  []JÚNIOR Rojas

## 2021-05-18 ENCOUNTER — CLINICAL SUPPORT (OUTPATIENT)
Dept: PULMONOLOGY | Facility: CLINIC | Age: 71
End: 2021-05-18

## 2021-05-18 VITALS
OXYGEN SATURATION: 98 % | SYSTOLIC BLOOD PRESSURE: 138 MMHG | HEART RATE: 76 BPM | TEMPERATURE: 97.7 F | RESPIRATION RATE: 16 BRPM | DIASTOLIC BLOOD PRESSURE: 85 MMHG

## 2021-05-18 DIAGNOSIS — J44.9 CHRONIC OBSTRUCTIVE PULMONARY DISEASE, UNSPECIFIED COPD TYPE (HCC): ICD-10-CM

## 2021-05-18 PROCEDURE — G0424 PULMONARY REHAB W EXER: HCPCS | Performed by: INTERNAL MEDICINE

## 2021-05-18 NOTE — PROGRESS NOTES
[facility]  Pulmonary Rehabilitation  Daily Treatment Log    Name: Migue Alexander : 1950 Date: 2021     Session: 7/ approved: 18          Time In/Out: 1:     COPD Charges:  x1    Physician : JÚNIOR Rojas DO      Dx: J44.9      GOLD: 1 []  2 []  3 []  4 []     [place] Insurance: [place]  Secondary Insurance:[place]     /85   Pulse 76   Temp 97.7 °F (36.5 °C)   Resp 16   SpO2 98%      Auscultation:  []Clear   [x]Clear and Decreased   []Insp. Wheeze   []Exp. Wheeze     []Rhonchi   []Rales    Target Heart Rate Zone:   Max HR: 150    Exercise Sp02% Blood Pressure Heart Rate JERMAIN Scale/Fatigue   Treadmill: Speed:    Min:   % Grade:        Distance:            Bands:#:    Reps:      Min:   Color Band:           6MWT:Min:   Distance:  MET:             Step-ups:#: 2   reps: 10   min: 10  97  138/88  81  1-2   Cybex/Vision/Schwinn Bike:  Level:   Speed:  RPM:   Min:   Distance:           Seat:             Hand Weights: Pounds: 5 min: 10  Curls# 10     Sets: 1  Presses#  10  Sets: 1  Fly# 10           Sets: 1  Shoulder Shrugs#  10   Sets: 1  Wings#  10     Sets: 1  Wrist Curls# 10  Sets: 1                  98  138/85  78  1-2   NuStep: Level: 5 METS: 2.4 min: 30     SPM:   51 total Steps: 1930  Seat: 13    arms: 11  98  138/88  79  2-3   Arm Ergometer: Level:     Min:   RPM:       Fwd:     Back:   Total Turns:             PLB / DB Spinner: Min: 5  P-Flex: Level:        Min:   IS: Volume:             Min:   Warm-up Stretches: Min: 10  98    76  0      Education and Training: [x]JERMAIN Scale  [x]PLB  [x]DB  []IMT  []IS  [x]Vital Signs   [x]Target HR Zone  [x]Exercise Vital Signs [x] Safe Vital Signs  []Pulmonary Anatomy  []Lung Function  []Lung Disease Process  []Oxygen Use  []Oxygen Safety  []Pulmonary Meds  []Med Instruction  []Cough Technique  []Pulmonary Hygeine  []Infection Prevention  []Signs of Infection  []When to Call MD  []Nutrition  []Weight Management  []Advance  Directives  []Posture  []Body Mechanics []Energy Conservation  []Pacing ADL's  [x]Stress/Anxiety Management  [x]Stretching  []Home Exercise  []Smoking Cessation   []OR Progress  []Maintenance Exercise Program    []Pulmonary Knowledge Test/Education  []Review PFT  []Review Specific Disease  []Review Home Follow-through Compliance  []Safe Exercise []Exercise Progression Strategies    Progress Report: Patient here for continuation of pulmonary rehabilitation.  Education consisted of stress/anxiety management, coping mechanisms for COPD.  Patient was able to increase tolerance and resistance of exercise today.  Patient continues to do very well.  Patient subjectively stated that he can walk further without shortness of breath.    Treating Clinicians: [x]Kun Yuen RRT MD in Office: []JÚNIOR Mckeon  []RAFITA Jim  [x]CODEY German  [x]JR Felix    []ARLIN Zendejas []ABE Roche  []RAFITA Gonsales  []RAFITA Grajeda  []CRISTINA Felix  []CATRINA Ulrich   []ARLIN Cuenca  [x]JÚNIOR Rojas

## 2021-05-20 ENCOUNTER — CLINICAL SUPPORT (OUTPATIENT)
Dept: PULMONOLOGY | Facility: CLINIC | Age: 71
End: 2021-05-20

## 2021-05-20 VITALS
TEMPERATURE: 97.7 F | OXYGEN SATURATION: 95 % | HEART RATE: 83 BPM | RESPIRATION RATE: 16 BRPM | SYSTOLIC BLOOD PRESSURE: 140 MMHG | DIASTOLIC BLOOD PRESSURE: 90 MMHG

## 2021-05-20 DIAGNOSIS — J44.9 CHRONIC OBSTRUCTIVE PULMONARY DISEASE, UNSPECIFIED COPD TYPE (HCC): ICD-10-CM

## 2021-05-20 PROCEDURE — G0424 PULMONARY REHAB W EXER: HCPCS | Performed by: INTERNAL MEDICINE

## 2021-05-20 NOTE — PROGRESS NOTES
[facility]  Pulmonary Rehabilitation  Daily Treatment Log    Name: Migue Alexander : 1950 Date: 2021     Session:  approved: 18          Time In/Out: 1:     COPD Charges:  x1    Physician : JÚNIOR Rojas DO      Dx: J44.9      GOLD: 1 []  2 []  3 []  4 []     [place] Insurance: [place]  Secondary Insurance:[place]     /90   Pulse 83   Temp 97.7 °F (36.5 °C)   Resp 16   SpO2 95%      Auscultation:  []Clear   [x]Clear and Decreased   []Insp. Wheeze   []Exp. Wheeze     []Rhonchi   []Rales    Target Heart Rate Zone:   Max HR: 150    Exercise Sp02% Blood Pressure Heart Rate JERMAIN Scale/Fatigue   Treadmill: Speed:    Min:   % Grade:        Distance:            Bands:#:    Reps:      Min:   Color Band:             6MWT:Min:   Distance:  MET:             Step-ups:#: 2   reps: 10   min: 10  95  140/90  83  1-2   Cybex/Vision/Schwinn Bike:  Level:   Speed:  RPM:   Min:   Distance:           Seat:             Hand Weights: Pounds: 5 min: 10  Curls# 10     Sets: 1  Presses#  10  Sets: 1  Fly# 10           Sets: 1  Shoulder Shrugs#  10   Sets: 1  Wings#  10     Sets: 1  Wrist Curls# 10  Sets: 1                  95  140/90  83  0-1   NuStep: Level: 7 METS: 2.4 min: 30     SPM:   51 total Steps: 1711  Seat: 13    arms: 11  98  138/80  92  1-2   Arm Ergometer: Level:     Min:   RPM:       Fwd:     Back:   Total Turns:             PLB / DB Spinner: Min:   P-Flex: Level:       Min:   IS: Volume:             Min:   Warm-up Stretches: Min: 10  95    83  0      Education and Training: [x]JERMAIN Scale  [x]PLB  [x]DB  []IMT  []IS  [x]Vital Signs   [x]Target HR Zone  [x]Exercise Vital Signs [x] Safe Vital Signs  []Pulmonary Anatomy  []Lung Function  []Lung Disease Process  [x]Oxygen Use  [x]Oxygen Safety  []Pulmonary Meds  []Med Instruction  []Cough Technique  []Pulmonary Hygeine  []Infection Prevention  []Signs of Infection  []When to Call MD  []Nutrition  []Weight Management   []Advance Directives  []Posture  []Body Mechanics []Energy Conservation  []Pacing ADL's  []Stress/Anxiety Management  [x]Stretching  []Home Exercise  []Smoking Cessation   []FL Progress  []Maintenance Exercise Program    []Pulmonary Knowledge Test/Education  []Review PFT  []Review Specific Disease  []Review Home Follow-through Compliance  []Safe Exercise []Exercise Progression Strategies    Progress Report: Patient here for continuation of pulmonary rehabilitation.  Educational component consisted of oxygen use and oxygen safety.  Patient continues to do well, very motivated to do better.  Minimal cues for PLB.    Treating Clinicians: [x]Kun Yuen RRT     MD in Office: []JÚNIOR Mckeon  []RAFITA Jim  []CODEY German  [x]JR Felix    []ARLIN Zendejas []ABE Roche  []RAFITA Gonsales  []RAFITA Grajeda  []CRISTINA Felix  []CATRINA Ulrich   []ARLIN Cuenca  [x]JÚNIOR Rojas

## 2021-05-25 ENCOUNTER — CLINICAL SUPPORT (OUTPATIENT)
Dept: PULMONOLOGY | Facility: CLINIC | Age: 71
End: 2021-05-25

## 2021-05-25 VITALS
HEART RATE: 73 BPM | SYSTOLIC BLOOD PRESSURE: 138 MMHG | OXYGEN SATURATION: 99 % | DIASTOLIC BLOOD PRESSURE: 80 MMHG | TEMPERATURE: 97.6 F | RESPIRATION RATE: 16 BRPM

## 2021-05-25 DIAGNOSIS — J44.9 CHRONIC OBSTRUCTIVE PULMONARY DISEASE, UNSPECIFIED COPD TYPE (HCC): ICD-10-CM

## 2021-05-25 PROCEDURE — G0424 PULMONARY REHAB W EXER: HCPCS | Performed by: INTERNAL MEDICINE

## 2021-05-25 NOTE — PROGRESS NOTES
[facility]  Pulmonary Rehabilitation  Daily Treatment Log    Name: Migue Alexander : 1950 Date: 2021     Session: 9/ approved: 18          Time In/Out: 1:     COPD Charges:  x1    Physician : JÚNIOR Rojas DO      Dx: J44.9      GOLD: 1 []  2 []  3 []  4 []     [place] Insurance: [place]  Secondary Insurance:[place]     /80   Pulse 73   Temp 97.6 °F (36.4 °C)   Resp 16   SpO2 99%      Auscultation:  []Clear   [x]Clear and Decreased   []Insp. Wheeze   []Exp. Wheeze     []Rhonchi   []Rales    Target Heart Rate Zone:   Max HR: 150    Exercise Sp02% Blood Pressure Heart Rate JERMAIN Scale/Fatigue   Treadmill: Speed:    Min:   % Grade:        Distance:           Bands:#:    Reps:      Min:   Color Band:             6MWT:Min:   Distance:  MET:             Step-ups:#: 2   reps: 10   min: 10  98  138/80  75  1-2   Cybex/Vision/Schwinn Bike:  Level:   Speed:  RPM:   Min:   Distance:           Seat:             Hand Weights: Pounds: 5 min: 10  Curls# 10     Sets: 1  Presses#  10  Sets: 1  Fly# 10           Sets: 1  Shoulder Shrugs#  10   Sets: 1  Wings#  10     Sets: 11  Wrist Curls# 10  Sets: 1                  99  138/80  73  0   NuStep: Level: 7 METS: 2.4 min: 25     SPM:   57 total Steps: 1475  Seat: 13    arms: 11  97  140/82  83  1-2   Arm Ergometer: Level:     Min:   RPM:       Fwd:     Back:   Total Turns:             PLB / DB Spinner: Min: 5  P-Flex: Level:        Min:   IS: Volume:             Min:   Warm-up Stretches: Min: 10  99    73  0      Education and Training: [x]JERMAIN Scale  [x]PLB  [x]DB  []IMT  []IS  [x]Vital Signs   [x]Target HR Zone  [x]Exercise Vital Signs [x] Safe Vital Signs  []Pulmonary Anatomy  []Lung Function  []Lung Disease Process  []Oxygen Use  []Oxygen Safety  []Pulmonary Meds  []Med Instruction  []Cough Technique  []Pulmonary Hygeine  []Infection Prevention  []Signs of Infection  []When to Call MD  []Nutrition  []Weight Management  []Advance  Directives  []Posture  []Body Mechanics []Energy Conservation  []Pacing ADL's  []Stress/Anxiety Management  [x]Stretching  []Home Exercise  []Smoking Cessation   [x]ND Progress  [x]Maintenance Exercise Program    []Pulmonary Knowledge Test/Education  []Review PFT  []Review Specific Disease  [x]Review Home Follow-through Compliance  [x]Safe Exercise [x]Exercise Progression Strategies    Progress Report:  Patient here for continuation of pulmonary rehabilitation.  Educational component consisted of pulmonary rehab maintenance program information, ND progress and safe exercise progressions strategies.  Patient continues to do very well with minimal cues for PLB    Treating Clinicians: [x]Kun Yuen RRT     MD in Office: [x]JÚNIOR Mckeon  []RAFITA Jim  []CODEY German  []JR Felix    []ARLIN Zendejas []ABE Roche  []RAFITA Gonsales  [x]RAFITA Grajeda  []CRISTINA Felix  []CATRINA Ulrich   []ARLIN Cuenca  []JÚNIOR Rojas

## 2021-06-01 ENCOUNTER — CLINICAL SUPPORT (OUTPATIENT)
Dept: PULMONOLOGY | Facility: CLINIC | Age: 71
End: 2021-06-01

## 2021-06-01 VITALS
HEART RATE: 80 BPM | RESPIRATION RATE: 16 BRPM | OXYGEN SATURATION: 98 % | SYSTOLIC BLOOD PRESSURE: 138 MMHG | DIASTOLIC BLOOD PRESSURE: 90 MMHG

## 2021-06-01 DIAGNOSIS — J44.9 CHRONIC OBSTRUCTIVE PULMONARY DISEASE, UNSPECIFIED COPD TYPE (HCC): ICD-10-CM

## 2021-06-01 PROCEDURE — G0424 PULMONARY REHAB W EXER: HCPCS | Performed by: INTERNAL MEDICINE

## 2021-06-01 NOTE — PROGRESS NOTES
[facility]  Pulmonary Rehabilitation  Daily Treatment Log    Name: Migue Alexander : 1950 Date: 2021     Session: 11/ approved: 18          Time In/Out: 1:     COPD Charges:  x1    Physician : JÚNIOR Rojas DO      Dx: J44.9      GOLD: 1 []  2 []  3 []  4 []     [place] Insurance: [place]   Secondary Insurance:[place]    /90   Pulse 80   Resp 16   SpO2 98%      Auscultation:  []Clear   [x]Clear and Decreased   []Insp. Wheeze   []Exp. Wheeze     []Rhonchi   []Rales    Target Heart Rate Zone:   Max HR: 150    Exercise Sp02% Blood Pressure Heart Rate JERMAIN Scale/Fatigue   Treadmill: Speed:    Min:   % Grade:        Distance:           Bands:#:    Reps:      Min:   Color Band:             6MWT:Min:   Distance:  MET:             Step-ups:#: 2   reps: 10   min: 10  98  138/90  83  1-2   Cybex/Vision/Schwinn Bike:  Level:   Speed:  RPM:   Min:   Distance:           Seat:             Hand Weights: Pounds: 5 min: 10  Curls# 10     Sets: 1  Presses#  10  Sets: 1  Fly# 10           Sets: 1  Shoulder Shrugs#  10   Sets: 1  Wings#  10     Sets: 1  Wrist Curls# 10  Sets: 1                  98  138/90  80  0-1   NuStep: Level: 7 METS: 2.4 min: 30     SPM:   61 total Steps: 1803  Seat: 13    arms: 11  97  138/88  88  1-2   Arm Ergometer: Level:     Min:   RPM:       Fwd:     Back:   Total Turns:             PLB / DB Spinner: Min: 5  P-Flex: Level:        Min:   IS: Volume:             Min:   Warm-up Stretches: Min: 10  98    80 0      Education and Training: [x]JERMAIN Scale  [x]PLB  [x]DB  []IMT  []IS  [x]Vital Signs   [x]Target HR Zone  [x]Exercise Vital Signs [x] Safe Vital Signs  []Pulmonary Anatomy  []Lung Function  []Lung Disease Process  []Oxygen Use  []Oxygen Safety  []Pulmonary Meds  []Med Instruction  []Cough Technique  []Pulmonary Hygeine  []Infection Prevention  []Signs of Infection  []When to Call MD  []Nutrition  []Weight Management  []Advance Directives  []Posture   []Body Mechanics []Energy Conservation  []Pacing ADL's  []Stress/Anxiety Management  [x]Stretching  []Home Exercise  []Smoking Cessation   []IL Progress  []Maintenance Exercise Program    []Pulmonary Knowledge Test/Education  []Review PFT  []Review Specific Disease  []Review Home Follow-through Compliance  []Safe Exercise []Exercise Progression Strategies    Progress Report:   Patient here for continuation of pulmonary rehabilitation.  All educational goals have been met.  Patient continues to do well with increased tolerance of exercise and endurance.  Minimal cues for PLB.    Treating Clinicians: [x]Kun Yuen RRT     MD in Office: [x]JÚNIOR Mckeon  []RAFITA Jim  []CODEY German  [x]JR Felix    []ARLIN Zendejas []ABE Roche  []RAFITA Gonsales  []RAFITA Grajeda  []CRISTINA Felix  []CATRINA Ulrich   []ARLIN Cuenca  [x]JÚNIOR Rojas

## 2021-06-03 ENCOUNTER — CLINICAL SUPPORT (OUTPATIENT)
Dept: PULMONOLOGY | Facility: CLINIC | Age: 71
End: 2021-06-03

## 2021-06-03 VITALS
DIASTOLIC BLOOD PRESSURE: 90 MMHG | HEART RATE: 74 BPM | RESPIRATION RATE: 16 BRPM | OXYGEN SATURATION: 96 % | SYSTOLIC BLOOD PRESSURE: 135 MMHG

## 2021-06-03 DIAGNOSIS — J44.9 CHRONIC OBSTRUCTIVE PULMONARY DISEASE, UNSPECIFIED COPD TYPE (HCC): ICD-10-CM

## 2021-06-03 PROCEDURE — G0424 PULMONARY REHAB W EXER: HCPCS | Performed by: INTERNAL MEDICINE

## 2021-06-03 NOTE — PROGRESS NOTES
[facility]  Pulmonary Rehabilitation  Daily Treatment Log    Name: Migue Alexander : 1950 Date: 6/3/2021     Session: 12/ approved: 18          Time In/Out: 1:     COPD Charges:  x1    Physician : JÚNIOR Rojas DO      Dx: J44.9      GOLD: 1 []  2 []  3 []  4 []     [place] Insurance: [place]  [place]     /90   Pulse 74   Resp 16   SpO2 96%      Auscultation:  []Clear   [x]Clear and Decreased   []Insp. Wheeze   []Exp. Wheeze     []Rhonchi   []Rales    Target Heart Rate Zone:   Max HR: 150    Exercise Sp02% Blood Pressure Heart Rate JERMAIN Scale/Fatigue   Treadmill: Speed:    Min:   % Grade:        Distance:            Bands:#:    Reps:      Min:   Color Band:             6MWT:Min:   Distance:  MET:             Step-ups:#: 2   reps: 10   min: 10  96  135/90  76  1-2   Cybex/Vision/Schwinn Bike:  Level:   Speed:  RPM:   Min:   Distance:           Seat:             Hand Weights: Pounds: 5 min: 10  Curls# 10     Sets: 1  Presses#  10  Sets: 1  Fly# 10           Sets: 1  Shoulder Shrugs#  10   Sets: 1  Wings#  10     Sets: 1  Wrist Curls# 10  Sets: 1                  96  135/90  74  0   NuStep: Level: 7 METS: 2.4 min: 30     SPM:   61 total Steps: 1906  Seat: 13    arms: 11  97  135/90  85  2-3   Arm Ergometer: Level:     Min:   RPM:       Fwd:     Back:   Total Turns:             PLB / DB Spinner: Min: 5  P-Flex: Level:        Min:   IS: Volume:             Min:   Warm-up Stretches: Min: 10  96    74  0      Education and Training: [x]JERMAIN Scale  [x]PLB  [x]DB  []IMT  []IS  [x]Vital Signs   [x]Target HR Zone  [x]Exercise Vital Signs [x] Safe Vital Signs  []Pulmonary Anatomy  []Lung Function  []Lung Disease Process  []Oxygen Use  []Oxygen Safety  []Pulmonary Meds  []Med Instruction  []Cough Technique  []Pulmonary Hygeine  []Infection Prevention  []Signs of Infection  []When to Call MD  []Nutrition  []Weight Management  []Advance Directives  []Posture   []Body Mechanics []Energy Conservation  []Pacing ADL's  []Stress/Anxiety Management  [x]Stretching  []Home Exercise  []Smoking Cessation   []DE Progress  []Maintenance Exercise Program    []Pulmonary Knowledge Test/Education  []Review PFT  []Review Specific Disease  []Review Home Follow-through Compliance  []Safe Exercise []Exercise Progression Strategies    Progress Report:  Patient here for continuation of pulmonary rehabilitation.  All educational goals have been met and patient continues to do well with minimal cues for PLB.    Treating Clinicians: [x]Kun Yuen RRT     MD in Office: [x]JÚNIOR Mckeon  []RAFITA Jim  []CODEY German  [x]JR Felix    []ARLIN Zendejas []ABE Roche  []RAFITA Gonsales  []RAFITA Grajeda  []CRISTINA Felix  []CATRINA Ulrich   []ARLIN Cuenca  [x]JÚNIOR Rojas

## 2021-06-08 ENCOUNTER — CLINICAL SUPPORT (OUTPATIENT)
Dept: PULMONOLOGY | Facility: CLINIC | Age: 71
End: 2021-06-08

## 2021-06-08 VITALS
DIASTOLIC BLOOD PRESSURE: 90 MMHG | OXYGEN SATURATION: 98 % | SYSTOLIC BLOOD PRESSURE: 138 MMHG | HEART RATE: 74 BPM | RESPIRATION RATE: 16 BRPM

## 2021-06-08 DIAGNOSIS — J44.9 CHRONIC OBSTRUCTIVE PULMONARY DISEASE, UNSPECIFIED COPD TYPE (HCC): ICD-10-CM

## 2021-06-08 PROCEDURE — G0424 PULMONARY REHAB W EXER: HCPCS | Performed by: INTERNAL MEDICINE

## 2021-06-08 NOTE — PROGRESS NOTES
[facility]  Pulmonary Rehabilitation  Daily Treatment Log    Name: Migue Alexander : 1950 Date: 2021     Session: 13/ approved: 18          Time In/Out: 1:     COPD Charges:  x1    Physician : JÚNIOR Rojas DO      Dx: J44.9      GOLD: 1 []  2 []  3 []  4 []     [place] Insurance: [place]  Secondary Insurance:[place]     /90   Pulse 74   Resp 16   SpO2 98%      Auscultation:  []Clear   [x]Clear and Decreased   []Insp. Wheeze   []Exp. Wheeze     []Rhonchi   []Rales    Target Heart Rate Zone:   Max HR: 150    Exercise Sp02% Blood Pressure Heart Rate JERMAIN Scale/Fatigue   Treadmill: Speed:    Min:   % Grade:        Distance:            Bands:#:    Reps:      Min:   Color Band:             6MWT:Min:   Distance:  MET:             Step-ups:#: 2   reps: 10   min: 10  98  138/90  77  1-2   Cybex/Vision/Schwinn Bike:  Level: 1-3 Speed: 11.6 RPM: 41 min: 10  Distance: 1.7         seat: 10  98  138/90  91  1-2   Hand Weights: Pounds: 5 min: 10  Curls# 10     Sets: 1  Presses#  10  Sets: 1  Fly# 10           Sets: 1  Shoulder Shrugs#  10   Sets: 1  Wings#  10     Sets: 1  Wrist Curls# 10  Sets: 1                  98  138/90  74  0   NuStep: Level: 5 METS: 2.4 min: 20     SPM:   51 total Steps: 1239  Seat: 13    arms: 11  99  138/90  100  1-2   Arm Ergometer: Level:     Min:   RPM:       Fwd:     Back:   Total Turns:             PLB / DB Spinner: Min: 5   P-Flex: Level:        Min:   IS: Volume:             Min:   Warm-up Stretches: Min: 10  98    74  0      Education and Training: [x]JERMAIN Scale  [x]PLB  [x]DB  []IMT  []IS  [x]Vital Signs   [x]Target HR Zone  [x]Exercise Vital Signs [x] Safe Vital Signs  []Pulmonary Anatomy  []Lung Function  []Lung Disease Process  []Oxygen Use  []Oxygen Safety  []Pulmonary Meds  []Med Instruction  []Cough Technique  []Pulmonary Hygeine  []Infection Prevention  []Signs of Infection  []When to Call MD  []Nutrition  []Weight Management   []Advance Directives  []Posture  []Body Mechanics []Energy Conservation  []Pacing ADL's  []Stress/Anxiety Management  [x]Stretching  []Home Exercise  []Smoking Cessation   []MD Progress  []Maintenance Exercise Program    []Pulmonary Knowledge Test/Education  []Review PFT  []Review Specific Disease  []Review Home Follow-through Compliance  []Safe Exercise []Exercise Progression Strategies    Progress Report: Here for continuation of pulmonary rehabilitation.  All educational goals have been met.  Patient continues to do well with minimal cues for PLB.    Treating Clinicians: [x]Kun Yuen RRT     MD in Office: []JÚNIOR Mckeon  []RAFITA Jim  []CODEY German  [x]JR Felix    []ARLIN Zendejas []ABE Roche  []RAFITA Gonsales  []RAFITA Grajeda  []CRISTINA Felix  [x]CATRINA Ulrich   []ARLIN Cuenca  []JÚNIOR Rojas

## 2021-06-10 ENCOUNTER — CLINICAL SUPPORT (OUTPATIENT)
Dept: PULMONOLOGY | Facility: CLINIC | Age: 71
End: 2021-06-10

## 2021-06-10 VITALS
HEART RATE: 82 BPM | DIASTOLIC BLOOD PRESSURE: 80 MMHG | SYSTOLIC BLOOD PRESSURE: 138 MMHG | RESPIRATION RATE: 18 BRPM | OXYGEN SATURATION: 94 %

## 2021-06-10 DIAGNOSIS — J44.9 CHRONIC OBSTRUCTIVE PULMONARY DISEASE, UNSPECIFIED COPD TYPE (HCC): ICD-10-CM

## 2021-06-10 PROCEDURE — G0424 PULMONARY REHAB W EXER: HCPCS | Performed by: INTERNAL MEDICINE

## 2021-06-10 NOTE — PROGRESS NOTES
[facility]  Pulmonary Rehabilitation  Daily Treatment Log    Name: Migue Alexander : 1950 Date: 6/10/2021     Session: 14/ approved: 18          Time In/Out: 1:     COPD Charges:  x1    Physician : JÚNIOR Rojas DO      Dx: J44.9      GOLD: 1 []  2 []  3 []  4 []     [place] Insurance: [place]  Secondary Insurance:[place]     /80   Pulse 82   Resp 18   SpO2 94%      Auscultation:  []Clear   [x]Clear and Decreased   []Insp. Wheeze   []Exp. Wheeze     []Rhonchi   []Rales    Target Heart Rate Zone:   Max HR: 50    Exercise Sp02% Blood Pressure Heart Rate JERMAIN Scale/Fatigue   Treadmill: Speed:    Min:   % Grade:        Distance:            Bands:#:    Reps:      Min:   Color Band:             6MWT:Min:   Distance:  MET:             Step-ups:#: 2   reps: 10   min: 10  94  138/80  84  1-2   Cybex/Vision/Schwinn Bike:  Level:   Speed:  RPM:   Min:   Distance:           Seat:             Hand Weights: Pounds: 5 min: 10  Curls# 10     Sets: 1  Presses#  10  Sets: 1  Fly# 10           Sets: 1  Shoulder Shrugs#  10   Sets: 1  Wings#  10     Sets: 1  Wrist Curls# 10  Sets: 1                  94  138/80  82  0-1   NuStep: Level: 5 METS: 2.4 min: 30     SPM:   50 total Steps:   Seat: 13    arms: 11  98  140/95  110  1-2   Arm Ergometer: Level:     Min:   RPM:       Fwd:     Back:   Total Turns:             PLB / DB Spinner: Min: 5  P-Flex: Level:        Min:   IS: Volume:             Min:   Warm-up Stretches: Min: 10  94    82  0      Education and Training: [x]JERMAIN Scale  [x]PLB  [x]DB  []IMT  []IS  [x]Vital Signs   [x]Target HR Zone  [x]Exercise Vital Signs [x] Safe Vital Signs  []Pulmonary Anatomy  []Lung Function  []Lung Disease Process  []Oxygen Use  []Oxygen Safety  []Pulmonary Meds  []Med Instruction  []Cough Technique  []Pulmonary Hygeine  []Infection Prevention  []Signs of Infection  []When to Call MD  []Nutrition  []Weight Management  []Advance Directives  []Posture   []Body Mechanics []Energy Conservation  []Pacing ADL's  []Stress/Anxiety Management  [x]Stretching  []Home Exercise  []Smoking Cessation   []NE Progress  []Maintenance Exercise Program    []Pulmonary Knowledge Test/Education  []Review PFT  []Review Specific Disease  []Review Home Follow-through Compliance  []Safe Exercise []Exercise Progression Strategies    Progress Report:  Patient here for continuation of pulmonary rehabilitation.  Patient has met all educational goals.  Continues to do well with minimal cues for PLB.    Treating Clinicians: [x]Kun Yuen RRT     MD in Office: []JÚNIOR Mckeon  []RAFITA Jim  []CODEY German  [x]JR Felix    []ARLIN Zendejas []ABE Roche  []RAFITA Gonsales  []RAFITA Grajeda  []CRISTINA Felix  [x]CATRINA Ulrich   []ARLIN Cuenca  []JÚNIOR Rojas

## 2021-06-17 ENCOUNTER — CLINICAL SUPPORT (OUTPATIENT)
Dept: PULMONOLOGY | Facility: CLINIC | Age: 71
End: 2021-06-17

## 2021-06-17 VITALS
RESPIRATION RATE: 20 BRPM | OXYGEN SATURATION: 97 % | DIASTOLIC BLOOD PRESSURE: 80 MMHG | SYSTOLIC BLOOD PRESSURE: 130 MMHG | HEART RATE: 91 BPM

## 2021-06-17 DIAGNOSIS — J44.9 CHRONIC OBSTRUCTIVE PULMONARY DISEASE, UNSPECIFIED COPD TYPE (HCC): ICD-10-CM

## 2021-06-17 PROCEDURE — G0424 PULMONARY REHAB W EXER: HCPCS | Performed by: INTERNAL MEDICINE

## 2021-06-17 NOTE — PROGRESS NOTES
[facility]  Pulmonary Rehabilitation  Daily Treatment Log    Name: Migue Alexander : 1950 Date: 2021     Session: 15/ approved: 18          Time In/Out: 1:00/2:00     COPD Charges:  x1    Physician : JÚNIOR Rojas DO      Dx: J44.9      GOLD: 1 []  2 []  3 []  4 []     Primary Insurance: [place]  Secondary Insurance:[place]     /80   Pulse 91   Resp 20   SpO2 97%      Auscultation:  []Clear   [x]Clear and Decreased   []Insp. Wheeze   []Exp. Wheeze     []Rhonchi   []Rales    Target Heart Rate Zone:   Max HR: 150    Exercise Sp02% Blood Pressure Heart Rate JERMAIN Scale/Fatigue   Treadmill: Speed:    Min:   % Grade:        Distance:            Bands:#:    Reps:      Min:   Color Band:             6MWT:Min:   Distance:  MET:             Step-ups:#:    Reps:     Min:            Cybex/Vision/Schwinn Bike:  Level:   Speed:  RPM:   Min:   Distance:           Seat:             Hand Weights: Pounds:   Min:   Curls#     Sets:   Presses#    Sets:   Fly#            Sets:   Shoulder Shrugs#     Sets:   Wings#       Sets:   Wrist Curls#   Sets:                            NuStep: Level: 5   METS: 2.4 min: 30    SPM: 71    total Steps: 2109  Seat: 13     arms: 11  96  138/90  96  1-2   Arm Ergometer: Level:     Min:   RPM:       Fwd:     Back:   Total Turns:             PLB / DB Spinner: Min: 5  P-Flex: Level:        Min:   IS: Volume:             Min:   Warm-up Stretches: Min: 10  97    91  0      Education and Training: [x]JERMAIN Scale  [x]PLB  [x]DB  []IMT  []IS  [x]Vital Signs   [x]Target HR Zone  [x]Exercise Vital Signs [x] Safe Vital Signs  []Pulmonary Anatomy  []Lung Function  []Lung Disease Process  []Oxygen Use  []Oxygen Safety  []Pulmonary Meds  []Med Instruction  []Cough Technique  []Pulmonary Hygeine  []Infection Prevention  []Signs of Infection  []When to Call MD  []Nutrition  []Weight Management  []Advance Directives  []Posture  []Body Mechanics []Energy Conservation   []Pacing ADL's  []Stress/Anxiety Management  [x]Stretching  []Home Exercise  []Smoking Cessation   []OK Progress  []Maintenance Exercise Program    []Pulmonary Knowledge Test/Education  []Review PFT  []Review Specific Disease  []Review Home Follow-through Compliance  []Safe Exercise []Exercise Progression Strategies    Progress Report: Patient here for continuation of pulmonary rehabilitation.  Patient had to miss last session due to complaint of dizziness, patient is feeling better but still complaining of slight dizziness.  Vitals were normal and advised patient to continue with rehab but to take it easy today.  Patient did NuStep for 30 minutes after warm up and stretches and PLB exercises.  Patient continues to do well despite some setbacks.    Treating Clinicians: [x]Kun Yuen RRT     MD in Office: []JÚNIOR Mckeon  []RAFITA Jim  []CODEY German  []JR Felix    []ARLIN Zendejas [x]ABE Roche  []RAFITA Gonsales  []RAFITA Grajeda  []CRISTINA Felix  []CATRINA Ulrich   []ARLIN Cuenca  [x]JÚNIOR Rojas

## 2021-06-22 ENCOUNTER — CLINICAL SUPPORT (OUTPATIENT)
Dept: PULMONOLOGY | Facility: CLINIC | Age: 71
End: 2021-06-22

## 2021-06-22 DIAGNOSIS — J44.9 CHRONIC OBSTRUCTIVE PULMONARY DISEASE, UNSPECIFIED COPD TYPE (HCC): ICD-10-CM

## 2021-06-22 PROCEDURE — G0424 PULMONARY REHAB W EXER: HCPCS | Performed by: INTERNAL MEDICINE

## 2021-06-23 VITALS
DIASTOLIC BLOOD PRESSURE: 85 MMHG | HEART RATE: 63 BPM | RESPIRATION RATE: 16 BRPM | OXYGEN SATURATION: 98 % | SYSTOLIC BLOOD PRESSURE: 138 MMHG

## 2021-06-23 NOTE — PROGRESS NOTES
[facility]  Pulmonary Rehabilitation  Daily Treatment Log    Name: Migue Alexander : 1950 Date: 2021     Session: 16/ approved: 18          Time In/Out: 1:     COPD Charges:  x1    Physician : JÚNIOR Rojas DO      Dx: J44.9      GOLD: 1 []  2 []  3 []  4 []     [place] Insurance: [place]  Secondary Insurance:[place]     /85   Pulse 63   Resp 16   SpO2 98%      Auscultation:  []Clear   [x]Clear and Decreased   []Insp. Wheeze   []Exp. Wheeze     []Rhonchi   []Rales    Target Heart Rate Zone:   Max HR: 150    Exercise Sp02% Blood Pressure Heart Rate JERMAIN Scale/Fatigue   Treadmill: Speed:    Min:   % Grade:        Distance:            Bands:#:    Reps:      Min:   Color Band:             6MWT:Min:   Distance:  MET:             Step-ups:#: 2   reps: 10   min: 10  98  138/85  65  1-2   Cybex/Vision/Schwinn Bike:  Level:   Speed:  RPM:   Min:   Distance:           Seat:             Hand Weights: Pounds: 5 min: 10  Curls# 10     Sets: 1  Presses#  10  Sets: 1  Fly# 10           Sets: 1  Shoulder Shrugs#  10   Sets: 1  Wings#  10     Sets: 1  Wrist Curls# 10  Sets: 1                  98  138/85  63  0-1   NuStep: Level: 5 METS: 2.4 min: 30     SPM:   51 total Steps: 2400  Seat: 13    arms: 11  96  138/90  88  2-3   Arm Ergometer: Level:     Min:   RPM:       Fwd:     Back:   Total Turns:             PLB / DB Spinner: Min: 5  P-Flex: Level:        Min:   IS: Volume:             Min:   Warm-up Stretches: Min: 10  98    63  0      Education and Training: [x]JERMAIN Scale  [x]PLB  [x]DB  []IMT  []IS  [x]Vital Signs   [x]Target HR Zone  [x]Exercise Vital Signs [x] Safe Vital Signs  []Pulmonary Anatomy  []Lung Function  []Lung Disease Process  []Oxygen Use  []Oxygen Safety  []Pulmonary Meds  []Med Instruction  []Cough Technique  []Pulmonary Hygeine  []Infection Prevention  []Signs of Infection  []When to Call MD  []Nutrition  []Weight Management  []Advance Directives  []Posture   []Body Mechanics []Energy Conservation  []Pacing ADL's  []Stress/Anxiety Management  [x]Stretching  []Home Exercise  []Smoking Cessation   [][place] Progress  []Maintenance Exercise Program    []Pulmonary Knowledge Test/Education  []Review PFT  []Review Specific Disease  []Review Home Follow-through Compliance  []Safe Exercise []Exercise Progression Strategies    Progress Report: Patient here for continuation of pulmonary rehabilitation.  All educational components have been met and patient continues to do well with minimal cues for PLB.    Treating Clinicians: [x]Kun Yuen RRT     MD in Office: []JÚNIOR Mckeon  []RAFITA Jim  []CODEY German  []JR Felix    []ARLIN Zendejas []ABE Roche  []RAFITA Gonsales  []RAFITA Grajeda  [x]CRISTINA Felix  [x]CATRINA Ulrich   []ARLIN Cuenca  []JÚNIOR Rojas

## 2021-06-24 ENCOUNTER — CLINICAL SUPPORT (OUTPATIENT)
Dept: PULMONOLOGY | Facility: CLINIC | Age: 71
End: 2021-06-24

## 2021-06-24 DIAGNOSIS — J44.9 CHRONIC OBSTRUCTIVE PULMONARY DISEASE, UNSPECIFIED COPD TYPE (HCC): ICD-10-CM

## 2021-06-24 PROCEDURE — G0424 PULMONARY REHAB W EXER: HCPCS | Performed by: INTERNAL MEDICINE

## 2021-06-25 VITALS
SYSTOLIC BLOOD PRESSURE: 140 MMHG | HEART RATE: 79 BPM | OXYGEN SATURATION: 95 % | RESPIRATION RATE: 16 BRPM | DIASTOLIC BLOOD PRESSURE: 90 MMHG

## 2021-06-29 ENCOUNTER — CLINICAL SUPPORT (OUTPATIENT)
Dept: PULMONOLOGY | Facility: CLINIC | Age: 71
End: 2021-06-29

## 2021-06-29 VITALS
RESPIRATION RATE: 16 BRPM | OXYGEN SATURATION: 99 % | DIASTOLIC BLOOD PRESSURE: 90 MMHG | SYSTOLIC BLOOD PRESSURE: 138 MMHG | HEART RATE: 61 BPM

## 2021-06-29 DIAGNOSIS — J44.9 CHRONIC OBSTRUCTIVE PULMONARY DISEASE, UNSPECIFIED COPD TYPE (HCC): ICD-10-CM

## 2021-06-29 PROCEDURE — G0424 PULMONARY REHAB W EXER: HCPCS | Performed by: INTERNAL MEDICINE

## 2021-06-29 NOTE — PROGRESS NOTES
[facility]  Pulmonary Rehabilitation  Daily Treatment Log    Name: Migue Alexander : 1950 Date: 2021     Session: 18/ approved: 18          Time In/Out: 1:     COPD Charges:  x1    Physician : JÚNIOR Rojas DO      Dx: J44.9      GOLD: 1 []  2 []  3 []  4 []     [place] Insurance: [place]  Secondary Insurance:[place]    /90   Pulse 61   Resp 16   SpO2 99%      Auscultation:  []Clear   [x]Clear and Decreased   []Insp. Wheeze   []Exp. Wheeze     []Rhonchi   []Rales    Target Heart Rate Zone:   Max HR: 150    Exercise Sp02% Blood Pressure Heart Rate JERMAIN Scale/Fatigue   Treadmill: Speed:    Min:   % Grade:        Distance:            Bands:#:    Reps:      Min:   Color Band:             6MWT:Min: 6 distance: 800 MET: 2.2  95  138/90  95  0-3   Step-ups:#: 2   reps: 10   min: 10  99  138/90  68  0-1   Cybex/Vision/Schwinn Bike:  Level:   Speed:  RPM:   Min:   Distance:           Seat:             Hand Weights: Pounds:   Min:   Curls#      Sets:  Presses#    Sets:   Fly#            Sets:   Shoulder Shrugs#     Sets:   Wings#       Sets:   Wrist Curls#   Sets:                           NuStep: Level: 5 METS: 2.4 min: 30     SPM:   68 total Steps: 2117  Seat: 13    arms: 11  98  138/90  76  0-1   Arm Ergometer: Level:     Min:   RPM:       Fwd:     Back:   Total Turns:             PLB / DB Spinner: Min: 5  P-Flex: Level:        Min:   IS: Volume:             Min:   Warm-up Stretches: Min: 10  99    61  0      Education and Training: [x]JERMAIN Scale  [x]PLB  [x]DB  []IMT  []IS  [x]Vital Signs   [x]Target HR Zone  [x]Exercise Vital Signs [x] Safe Vital Signs  []Pulmonary Anatomy  []Lung Function  []Lung Disease Process  []Oxygen Use  []Oxygen Safety  []Pulmonary Meds  []Med Instruction  []Cough Technique  []Pulmonary Hygeine  []Infection Prevention  []Signs of Infection  []When to Call MD  []Nutrition  []Weight Management  []Advance Directives  []Posture  []Body Mechanics  []Energy Conservation  []Pacing ADL's  []Stress/Anxiety Management  [x]Stretching  []Home Exercise  []Smoking Cessation   []OH Progress  []Maintenance Exercise Program    [x]Pulmonary Knowledge Test/Education  []Review PFT  []Review Specific Disease  [x]Review Home Follow-through Compliance  [x]Safe Exercise []Exercise Progression Strategies    Progress Report: Patient here for continuation of pulmonary rehabilitation.  All educational goals have been met and patient finished pulmonary rehab today.  Patient did very well despite previous injuries, patient worked through those injuries with minimal cues for PLB.  6M WT performed and patient did 200 feet more than initial test.  Patient has decided to continue rehab in the maintenance program.    Treating Clinicians: [x]Kun Yuen RRT MD in Office: []JÚNIOR Mckeon  []RAFITA Jim  []CODEY German  [x]JR Felix    []ARLIN Zendejas [x]ABE Roche  []RAFITA Gonsales  []RAFITA Grajeda  []CRISTINA Felix  []CATRINA Ulrich   []ARLIN Cuenca  [x]JÚNIOR Rojas

## 2021-07-13 ENCOUNTER — CLINICAL SUPPORT (OUTPATIENT)
Dept: PULMONOLOGY | Facility: CLINIC | Age: 71
End: 2021-07-13

## 2021-07-13 VITALS
RESPIRATION RATE: 16 BRPM | DIASTOLIC BLOOD PRESSURE: 90 MMHG | SYSTOLIC BLOOD PRESSURE: 140 MMHG | HEART RATE: 78 BPM | OXYGEN SATURATION: 95 %

## 2021-07-15 ENCOUNTER — CLINICAL SUPPORT (OUTPATIENT)
Dept: PULMONOLOGY | Facility: CLINIC | Age: 71
End: 2021-07-15

## 2021-07-15 VITALS
RESPIRATION RATE: 16 BRPM | SYSTOLIC BLOOD PRESSURE: 140 MMHG | DIASTOLIC BLOOD PRESSURE: 90 MMHG | OXYGEN SATURATION: 97 % | HEART RATE: 85 BPM

## 2021-07-20 ENCOUNTER — CLINICAL SUPPORT (OUTPATIENT)
Dept: PULMONOLOGY | Facility: CLINIC | Age: 71
End: 2021-07-20

## 2021-07-20 VITALS
SYSTOLIC BLOOD PRESSURE: 140 MMHG | RESPIRATION RATE: 16 BRPM | HEART RATE: 102 BPM | DIASTOLIC BLOOD PRESSURE: 90 MMHG | OXYGEN SATURATION: 98 %

## 2021-07-27 ENCOUNTER — CLINICAL SUPPORT (OUTPATIENT)
Dept: PULMONOLOGY | Facility: CLINIC | Age: 71
End: 2021-07-27

## 2021-07-27 VITALS
HEART RATE: 71 BPM | DIASTOLIC BLOOD PRESSURE: 85 MMHG | RESPIRATION RATE: 18 BRPM | SYSTOLIC BLOOD PRESSURE: 128 MMHG | OXYGEN SATURATION: 97 %

## 2021-08-03 ENCOUNTER — CLINICAL SUPPORT (OUTPATIENT)
Dept: PULMONOLOGY | Facility: CLINIC | Age: 71
End: 2021-08-03

## 2021-08-03 VITALS
OXYGEN SATURATION: 98 % | SYSTOLIC BLOOD PRESSURE: 140 MMHG | RESPIRATION RATE: 18 BRPM | HEART RATE: 86 BPM | DIASTOLIC BLOOD PRESSURE: 90 MMHG

## 2021-08-05 ENCOUNTER — CLINICAL SUPPORT (OUTPATIENT)
Dept: PULMONOLOGY | Facility: CLINIC | Age: 71
End: 2021-08-05

## 2021-08-05 VITALS
RESPIRATION RATE: 18 BRPM | SYSTOLIC BLOOD PRESSURE: 135 MMHG | OXYGEN SATURATION: 97 % | DIASTOLIC BLOOD PRESSURE: 95 MMHG | HEART RATE: 96 BPM

## 2021-08-10 ENCOUNTER — CLINICAL SUPPORT (OUTPATIENT)
Dept: PULMONOLOGY | Facility: CLINIC | Age: 71
End: 2021-08-10

## 2021-08-10 VITALS
SYSTOLIC BLOOD PRESSURE: 138 MMHG | DIASTOLIC BLOOD PRESSURE: 85 MMHG | RESPIRATION RATE: 18 BRPM | HEART RATE: 90 BPM | OXYGEN SATURATION: 100 %

## 2021-08-24 ENCOUNTER — CLINICAL SUPPORT (OUTPATIENT)
Dept: PULMONOLOGY | Facility: CLINIC | Age: 71
End: 2021-08-24

## 2021-08-24 VITALS
HEART RATE: 67 BPM | DIASTOLIC BLOOD PRESSURE: 90 MMHG | SYSTOLIC BLOOD PRESSURE: 135 MMHG | OXYGEN SATURATION: 99 % | RESPIRATION RATE: 18 BRPM

## 2021-08-24 DIAGNOSIS — J41.1 MUCOPURULENT CHRONIC BRONCHITIS (HCC): ICD-10-CM

## 2021-08-24 PROCEDURE — MNTEXCRVST: Performed by: INTERNAL MEDICINE

## 2021-08-26 ENCOUNTER — OFFICE VISIT (OUTPATIENT)
Dept: PULMONOLOGY | Facility: CLINIC | Age: 71
End: 2021-08-26

## 2021-08-26 VITALS
OXYGEN SATURATION: 90 % | BODY MASS INDEX: 38.94 KG/M2 | HEIGHT: 70 IN | TEMPERATURE: 97.7 F | SYSTOLIC BLOOD PRESSURE: 170 MMHG | DIASTOLIC BLOOD PRESSURE: 88 MMHG | HEART RATE: 88 BPM | WEIGHT: 272 LBS

## 2021-08-26 DIAGNOSIS — J41.1 MUCOPURULENT CHRONIC BRONCHITIS (HCC): Primary | ICD-10-CM

## 2021-08-26 DIAGNOSIS — K21.00 GASTROESOPHAGEAL REFLUX DISEASE WITH ESOPHAGITIS WITHOUT HEMORRHAGE: ICD-10-CM

## 2021-08-26 DIAGNOSIS — J30.9 ALLERGIC RHINITIS, UNSPECIFIED SEASONALITY, UNSPECIFIED TRIGGER: ICD-10-CM

## 2021-08-26 DIAGNOSIS — I50.22 CHRONIC SYSTOLIC CHF (CONGESTIVE HEART FAILURE) (HCC): ICD-10-CM

## 2021-08-26 PROBLEM — R06.00 DYSPNEA: Status: RESOLVED | Noted: 2020-11-18 | Resolved: 2021-08-26

## 2021-08-26 PROCEDURE — 99214 OFFICE O/P EST MOD 30 MIN: CPT | Performed by: INTERNAL MEDICINE

## 2021-08-26 NOTE — PROGRESS NOTES
Follow Up Office Note       Patient Name: Migue Alexander    Referring Physician: No ref. provider found    Chief Complaint:    Chief Complaint   Patient presents with   • Shortness of Breath       History of Present Illness: Migue Alexander is a 71 y.o. male who is here today to follow-up care with Pulmonary. Patient has a past medical history significant for COPD, obstructive sleep apnea, coronary artery disease as post CABG, CKD, PTSD, chronic alcohol abuse, and former tobacco user quit in 2017 with a 50-pack-year history.  Our last visit I kept him on Asmanex and Stiolto, but asked him to go to pulmonary rehab which she has since completed.  He states that he did very well pulmonary rehab and is actually doing the best he had many years.  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  Currently the he is not able to exercise due to the groin try doing arm workouts but due to his previous shoulder surgeries he cannot perform the exercise without severe pain.  Therefore he is limited at this time as far as physical ability.    Review of Systems:   Review of Systems   Constitutional: Negative for chills, fatigue and fever.   HENT: Negative for congestion and voice change.    Eyes: Negative for blurred vision.   Respiratory: Positive for shortness of breath. Negative for cough and wheezing.    Cardiovascular: Negative for chest pain.   Skin: Negative for dry skin.   Hematological: Negative for adenopathy.   Psychiatric/Behavioral: Negative for agitation and depressed mood.       The following portions of the patient's history were reviewed and updated as appropriate: allergies, current medications, past family history, past medical history, past social history, past surgical history and problem list.    Physical Exam:  Vital Signs:   Vitals:    08/26/21 1523   BP: 170/88   Pulse: 88   Temp: 97.7 °F (36.5  "°C)   SpO2: 90%  Comment: resting at room air   Weight: 123 kg (272 lb)   Height: 177.8 cm (70\")       Physical Exam  Vitals and nursing note reviewed.   Constitutional:       General: He is not in acute distress.     Appearance: He is well-developed and normal weight. He is not ill-appearing or toxic-appearing.   Cardiovascular:      Rate and Rhythm: Normal rate and regular rhythm.      Pulses: Normal pulses.      Heart sounds: Normal heart sounds. No murmur heard.   No gallop.    Pulmonary:      Effort: Pulmonary effort is normal.      Breath sounds: Normal breath sounds. No wheezing, rhonchi or rales.   Musculoskeletal:      Right lower leg: No edema.      Left lower leg: No edema.   Neurological:      Mental Status: He is alert.         Immunization History   Administered Date(s) Administered   • COVID-19 (PFIZER) 03/01/2021, 03/22/2021       Results Review:   - imaging from chest x-ray 11/18/2020 showed no acute cardiopulmonary findings, but was consistent with old granulomatous disease.  - PFT from 11/18/2020 showed moderate obstruction with significant air trapping, moderately reduced DLCO and no restriction.  - echo showing EF of 35 to 45%, cardiopulmonary stress test with cardiac and pulmonary limitations, and a right heart cath which shows signs of heart failure, and elevated pulmonary capillary wedge pressure as well as mean pulmonary artery pressure at 15 and 29 expectedly, but fully vascular system was only 2.6, as this is not consistent with pulmonary hypertension.    Assessment / Plan:   1. Mucopurulent chronic bronchitis (CMS/HCC) (Primary)  -From a COPD standpoint I want her to continue on the Asmanex and Stiolto.  [facility].  Unfortunately though this does limit does awaken utilize.  In addition to this I want to continue on the albuterol on a as needed basis.  -We discussed azithromycin and Daliresp again today, he did very well with pulmonary " rehab.  For now would like to hold on initiating either those medications and instead we will have to get him evaluated for the groin tumor and then after that we can get him back to some level of exercise at this point he understands that while doing pulmonary rehab he has had a significant benefit to his overall wellbeing and is willing to go back to utilizing once the groin tumor is taken care of.    2. Chronic systolic CHF (congestive heart failure) (CMS/[facility])  -Heart failure currently feels well controlled.  She continue his current regimen with Coreg, lisinopril, statin, and aspirin.    3. Gastroesophageal reflux disease with esophagitis without hemorrhage  -Reflux is overall well controlled.  Continue dietary lifestyle modifications in addition to Prilosec 20 mg daily.    4. Allergic rhinitis, unspecified seasonality, unspecified trigger  -Allergic rhinitis currently not an issue continue Flonase on an as-needed basis.    Follow Up:   Return in about 3 months (around 11/26/2021).       JÚNIOR Rojas, DO  Pulmonary and Critical Care Medicine  Note Electronically Signed    Please note that portions of this note may have been completed with a voice recognition program. Efforts were made to edit the dictations, but occasionally words are mistranscribed.

## 2021-11-30 ENCOUNTER — CLINICAL SUPPORT (OUTPATIENT)
Dept: PULMONOLOGY | Facility: CLINIC | Age: 71
End: 2021-11-30

## 2021-11-30 DIAGNOSIS — J41.1 MUCOPURULENT CHRONIC BRONCHITIS (HCC): ICD-10-CM

## 2021-11-30 PROCEDURE — MNTEXCRVST: Performed by: INTERNAL MEDICINE
